# Patient Record
Sex: FEMALE | Race: WHITE | Employment: FULL TIME | URBAN - METROPOLITAN AREA
[De-identification: names, ages, dates, MRNs, and addresses within clinical notes are randomized per-mention and may not be internally consistent; named-entity substitution may affect disease eponyms.]

---

## 2017-10-19 ENCOUNTER — HOSPITAL ENCOUNTER (OUTPATIENT)
Dept: MAMMOGRAPHY | Age: 42
Discharge: HOME | End: 2017-10-19
Attending: SPECIALIST | Admitting: SPECIALIST

## 2017-10-19 DIAGNOSIS — Z12.31 VISIT FOR SCREENING MAMMOGRAM: ICD-10-CM

## 2018-10-26 ENCOUNTER — HOSPITAL ENCOUNTER (OUTPATIENT)
Dept: MAMMOGRAPHY | Age: 43
Discharge: HOME | End: 2018-10-26
Payer: COMMERCIAL

## 2018-10-26 DIAGNOSIS — Z12.31 VISIT FOR SCREENING MAMMOGRAM: ICD-10-CM

## 2018-10-26 PROCEDURE — 77067 SCR MAMMO BI INCL CAD: CPT

## 2019-02-13 ENCOUNTER — HOSPITAL ENCOUNTER (OUTPATIENT)
Dept: PHYSICAL THERAPY | Age: 44
Setting detail: THERAPIES SERIES
Discharge: HOME | End: 2019-02-13
Payer: COMMERCIAL

## 2019-02-13 PROCEDURE — 97161 PT EVAL LOW COMPLEX 20 MIN: CPT | Performed by: PHYSICAL THERAPIST

## 2019-02-13 PROCEDURE — G8979 MOBILITY GOAL STATUS: HCPCS | Performed by: PHYSICAL THERAPIST

## 2019-02-13 PROCEDURE — G8978 MOBILITY CURRENT STATUS: HCPCS | Performed by: PHYSICAL THERAPIST

## 2019-02-13 PROCEDURE — 97110 THERAPEUTIC EXERCISES: CPT | Performed by: PHYSICAL THERAPIST

## 2019-02-15 ENCOUNTER — HOSPITAL ENCOUNTER (OUTPATIENT)
Dept: PHYSICAL THERAPY | Age: 44
Setting detail: THERAPIES SERIES
Discharge: HOME | End: 2019-02-15
Payer: COMMERCIAL

## 2019-02-15 PROCEDURE — 97530 THERAPEUTIC ACTIVITIES: CPT | Performed by: PHYSICAL THERAPIST

## 2019-02-15 PROCEDURE — 97110 THERAPEUTIC EXERCISES: CPT | Performed by: PHYSICAL THERAPIST

## 2019-02-18 ENCOUNTER — HOSPITAL ENCOUNTER (OUTPATIENT)
Dept: PHYSICAL THERAPY | Age: 44
Setting detail: THERAPIES SERIES
Discharge: HOME | End: 2019-02-18
Payer: COMMERCIAL

## 2019-02-18 PROCEDURE — 97530 THERAPEUTIC ACTIVITIES: CPT | Performed by: PHYSICAL THERAPIST

## 2019-02-18 PROCEDURE — 97110 THERAPEUTIC EXERCISES: CPT | Performed by: PHYSICAL THERAPIST

## 2019-02-22 ENCOUNTER — HOSPITAL ENCOUNTER (OUTPATIENT)
Dept: PHYSICAL THERAPY | Age: 44
Setting detail: THERAPIES SERIES
Discharge: HOME | End: 2019-02-22
Payer: COMMERCIAL

## 2019-02-22 PROCEDURE — 97530 THERAPEUTIC ACTIVITIES: CPT | Performed by: PHYSICAL THERAPIST

## 2019-02-22 PROCEDURE — 97110 THERAPEUTIC EXERCISES: CPT | Performed by: PHYSICAL THERAPIST

## 2019-02-26 ENCOUNTER — APPOINTMENT (OUTPATIENT)
Dept: PHYSICAL THERAPY | Age: 44
End: 2019-02-26
Payer: COMMERCIAL

## 2019-02-27 ENCOUNTER — HOSPITAL ENCOUNTER (OUTPATIENT)
Dept: PHYSICAL THERAPY | Age: 44
Setting detail: THERAPIES SERIES
Discharge: HOME | End: 2019-02-27
Payer: COMMERCIAL

## 2019-02-27 PROCEDURE — 97110 THERAPEUTIC EXERCISES: CPT | Performed by: PHYSICAL THERAPIST

## 2019-02-27 PROCEDURE — 97530 THERAPEUTIC ACTIVITIES: CPT | Performed by: PHYSICAL THERAPIST

## 2019-03-08 ENCOUNTER — HOSPITAL ENCOUNTER (OUTPATIENT)
Dept: PHYSICAL THERAPY | Age: 44
Setting detail: THERAPIES SERIES
Discharge: HOME | End: 2019-03-08
Payer: COMMERCIAL

## 2019-03-08 PROCEDURE — 97530 THERAPEUTIC ACTIVITIES: CPT | Performed by: PHYSICAL THERAPIST

## 2019-03-08 PROCEDURE — 97112 NEUROMUSCULAR REEDUCATION: CPT | Performed by: PHYSICAL THERAPIST

## 2019-03-08 PROCEDURE — 97110 THERAPEUTIC EXERCISES: CPT | Performed by: PHYSICAL THERAPIST

## 2019-03-15 ENCOUNTER — APPOINTMENT (OUTPATIENT)
Dept: PHYSICAL THERAPY | Age: 44
End: 2019-03-15
Payer: COMMERCIAL

## 2019-04-03 ENCOUNTER — HOSPITAL ENCOUNTER (OUTPATIENT)
Dept: PHYSICAL THERAPY | Age: 44
Setting detail: THERAPIES SERIES
Discharge: HOME | End: 2019-04-03
Payer: COMMERCIAL

## 2019-04-03 PROCEDURE — 97112 NEUROMUSCULAR REEDUCATION: CPT | Performed by: PHYSICAL THERAPIST

## 2019-04-03 PROCEDURE — 97530 THERAPEUTIC ACTIVITIES: CPT | Performed by: PHYSICAL THERAPIST

## 2019-04-03 PROCEDURE — 97110 THERAPEUTIC EXERCISES: CPT | Performed by: PHYSICAL THERAPIST

## 2019-04-03 NOTE — FLOWSHEET NOTE
[facility] [address] [place] [address] [facility]    Physical Therapy Daily Treatment Note  Date:  4/3/2019    Patient Name:  Kai Mai    :  1975  MRN: [id number]  Restrictions/Precautions:    Medical/Treatment Diagnosis Information:  · Diagnosis: s/p Left knee A-scope    · DOS: 19  · Treatment Diagnosis: decreased ROM; decreased strength; increased swelling; occasional pain  Insurance/Certification information:  PT Insurance Information: [employer]  Physician Information:  Referring Practitioner: Dilcia Haines MD  Plan of care signed (Y/N):     Date of Patient follow up with Physician: 19    G-Code (if applicable):      Date G-Code Applied:  19  PT G-Codes  Functional Assessment Tool Used: LEFS  Score: 69%  Functional Limitation: Mobility: Walking and moving around  Mobility: Walking and Moving Around Current Status (): At least 20 percent but less than 40 percent impaired, limited or restricted  Mobility: Walking and Moving Around Goal Status (): 0 percent impaired, limited or restricted   Updated LEFS 4-3-19: 80%    Progress Note: [x]  Yes  []  No  Next due by: Visit #10       Latex Allergy:  [x]NO      []YES  Preferred Language for Healthcare:   [x]English       []other:    Visit # Insurance Allowable   7 25 hard max     Pain level:   3-6/10    SUBJECTIVE:  9 weeks post-op. Pt has not been to therapy for 4 weeks. She states that she is doing well overall. She c/o's some re-occurring discomfort inside the joint. Pt feels worse with lying down, prolonged standing. Better with WBing and using her leg.      OBJECTIVE:   Observation: Quad/vmo= fair/fair-  Test measurements:    Knee flexion ROM= +2 to 142 degrees    RESTRICTIONS/PRECAUTIONS: OA    Exercises/Interventions:   Exercise/Equipment Resistance/Repetitions Other comments   Stretching     Hamstring [id number]    Towel Pull     Inclined Calf [id number]    Hip Flexor stretch Leg on chair [id number]    ITB     Groin Quad                    SLR     Supine 2# 3x10 Extension lag/VCs   Abduction 2# 3x10 Extension lag/VCs   Adduction 1# 3x10 VCs   Prone     SLR+          Isometrics     Quad sets          Patellar Glides     Medial     Superior     Inferior          ROM     Sheet Pulls     Hang Weights     Passive     Active     Weight Shift     Ankle Pumps     ERMI               CKC     Calf raises     Wall sits [id number]    Step ups     1 leg stand [id number]    Squatting     CC TKE     Balance LOS    bridges 3x10         PRE     Extension  RANGE:   Flexion  RANGE:        Quantum machines     Leg press  65# 3x10 VCs to not hyperextend the knee 90-10 degrees   Leg extension     Leg curl 25# 3x10 0-90 degrees        Manual interventions                     Therapeutic Exercise and NMR EXR  [x] (33263) Provided verbal/tactile cueing for activities related to strengthening, flexibility, endurance, ROM for improvements in LE, proximal hip, and core control with self care, mobility, lifting, ambulation.  [] (98571) Provided verbal/tactile cueing for activities related to improving balance, coordination, kinesthetic sense, posture, motor skill, proprioception  to assist with LE, proximal hip, and core control in self care, mobility, lifting, ambulation and eccentric single leg control.      NMR and Therapeutic Activities:    [x] (42779 or 20025) Provided verbal/tactile cueing for activities related to improving balance, coordination, kinesthetic sense, posture, motor skill, proprioception and motor activation to allow for proper function of core, proximal hip and LE with self care and ADLs  [] (38043) Gait Re-education- Provided training and instruction to the patient for proper LE, core and proximal hip recruitment and positioning and eccentric body weight control with ambulation re-education including up and down stairs     Home Exercise Program:    [x] (89413) Reviewed/Progressed HEP activities related to strengthening, flexibility, endurance, ROM of core, proximal hip and LE for functional self-care, mobility, lifting and ambulation/stair navigation   [x] (41882)Reviewed/Progressed HEP activities related to improving balance, coordination, kinesthetic sense, posture, motor skill, proprioception of core, proximal hip and LE for self care, mobility, lifting, and ambulation/stair navigation      Manual Treatments:  PROM / STM / Oscillations-Mobs:  G-I, II, III, IV (PA's, Inf., Post.)  [] (06367) Provided manual therapy to mobilize LE, proximal hip and/or LS spine soft tissue/joints for the purpose of modulating pain, promoting relaxation,  increasing ROM, reducing/eliminating soft tissue swelling/inflammation/restriction, improving soft tissue extensibility and allowing for proper ROM for normal function with self care, mobility, lifting and ambulation. Modalities:  Ice x 15 min    Charges:  Timed Code Treatment Minutes: 60   Total Treatment Minutes: 75       [] EVAL (LOW) 27665 (typically 20 minutes face-to-face)  [] EVAL (MOD) 41081 (typically 30 minutes face-to-face)  [] EVAL (HIGH) 21020 (typically 45 minutes face-to-face)  [] RE-EVAL   [x] YM(49780) x  2   [] IONTO  [x] NMR (36805) x  1   [] VASO  [] Manual (29994) x       [] Other:  [x] TA x  1    [] Mech Traction (61497)  [] ES(attended) (52752)      [] ES (un) (75932):      GOALS:  Patient stated goal: Flexibility. Less discomfort.      Therapist goals for Patient:   Short Term Goals: To be achieved in: 2 weeks  1. Independent in HEP and progression per patient tolerance, in order to prevent re-injury. 2. Patient will have a decrease in pain to facilitate improvement in movement, function, and ADLs as indicated by Functional Deficits.     Long Term Goals: To be achieved in: 8 weeks  1. Disability index score of 10% or less for the LEFS to assist with reaching prior level of function. Progressing well  2.  Patient will demonstrate increased AROM to WNL to allow for proper joint functioning as indicated by patients Functional Deficits. met  3. Patient will demonstrate an increase in Strength to good proximal hip strength and control in LE to allow for proper functional mobility as indicated by patients Functional Deficits. progressing well  4. Patient will return to ADL and household functional activities without increased symptoms or restriction. Progressing well  5. Pt will return to recreational activities including kneeling and squatting movements. not met    Progression Towards Functional goals:  [x] Patient is progressing as expected towards functional goals listed. [] Progression is slowed due to complexities listed. [] Progression has been slowed due to co-morbidities. [] Plan just implemented, too soon to assess goals progression  [] Other:     ASSESSMENT:  Pt is progressing steadily with strength and endurance 9 weeks post-surgery. She has occasional knee pain and she must be aware of patella protection during activities. The pt does not have weights at home or access to the gym. Extensive pt education on weights and resistance to increase strength and the importance of this for managing arthritis and improving her function. She is making progress overall towards the goals. Treatment/Activity Tolerance:  [x] Patient tolerated treatment well [] Patient limited by fatique  [] Patient limited by pain  [] Patient limited by other medical complications  [] Other:     Patient education: HEP/ progression at home and at the gym/pathology    Prognosis: [x] Good [] Fair  [] Poor    Patient Requires Follow-up: [x] Yes  [] No    PLAN: Strengthening, stretching, balance exercises.    [x] Continue per plan of care [] Alter current plan (see comments)  [] Plan of care initiated [] Hold pending MD visit [] Discharge    Electronically signed by: Ashlie Cali PT

## 2019-04-12 ENCOUNTER — HOSPITAL ENCOUNTER (OUTPATIENT)
Dept: PHYSICAL THERAPY | Age: 44
Setting detail: THERAPIES SERIES
Discharge: HOME | End: 2019-04-12
Payer: COMMERCIAL

## 2019-04-12 PROCEDURE — 97112 NEUROMUSCULAR REEDUCATION: CPT | Performed by: PHYSICAL THERAPIST

## 2019-04-12 PROCEDURE — 97530 THERAPEUTIC ACTIVITIES: CPT | Performed by: PHYSICAL THERAPIST

## 2019-04-12 PROCEDURE — 97110 THERAPEUTIC EXERCISES: CPT | Performed by: PHYSICAL THERAPIST

## 2019-04-12 NOTE — FLOWSHEET NOTE
[address] and [facility], [facility]    Physical Therapy Daily Treatment Note  Date:  2019    Patient Name:  Juan Lyon    :  1975  MRN: [id number]  Restrictions/Precautions:    Medical/Treatment Diagnosis Information:  · Diagnosis: s/p Left knee A-scope    · DOS: 19  · Treatment Diagnosis: decreased ROM; decreased strength; increased swelling; occasional pain  Insurance/Certification information:  PT Insurance Information: [employer]  Physician Information:  Referring Practitioner: Ninfa Fontanez MD  Plan of care signed (Y/N):     Date of Patient follow up with Physician:     G-Code (if applicable):      Date G-Code Applied:  19  PT G-Codes  Functional Assessment Tool Used: LEFS  Score: 69%  Functional Limitation: Mobility: Walking and moving around  Mobility: Walking and Moving Around Current Status (): At least 20 percent but less than 40 percent impaired, limited or restricted  Mobility: Walking and Moving Around Goal Status (): 0 percent impaired, limited or restricted   Updated LEFS 4-3-19: 80%    Progress Note: [x]  Yes  []  No  Next due by: Visit #10       Latex Allergy:  [x]NO      []YES  Preferred Language for Healthcare:   [x]English       []other:    Visit # Insurance Allowable   8 25 hard max     Pain level:       SUBJECTIVE:  Pt is doing well today.     OBJECTIVE:   Observation: Quad/vmo= fair/fair-  Test measurements:    Knee flexion ROM= +2 to 142 degrees    RESTRICTIONS/PRECAUTIONS: OA    Exercises/Interventions:   Exercise/Equipment Resistance/Repetitions Other comments   Stretching     Hamstring [id number]    Towel Pull     Inclined Calf 2e23erv    Hip Flexor stretch Leg on chair [id number]    ITB     Groin     Quad                    SLR     Supine 2# 3x10 Extension lag/VCs   Abduction 2# 3x10 Extension lag/VCs   Adduction 2# 3x10 VCs   Prone 0# 3x10    SLR+          Isometrics     Quad sets          Patellar Glides     Medial kinesthetic sense, posture, motor skill, proprioception of core, proximal hip and LE for self care, mobility, lifting, and ambulation/stair navigation      Manual Treatments:  PROM / STM / Oscillations-Mobs:  G-I, II, III, IV (PA's, Inf., Post.)  [] (66838) Provided manual therapy to mobilize LE, proximal hip and/or LS spine soft tissue/joints for the purpose of modulating pain, promoting relaxation,  increasing ROM, reducing/eliminating soft tissue swelling/inflammation/restriction, improving soft tissue extensibility and allowing for proper ROM for normal function with self care, mobility, lifting and ambulation. Modalities:  Ice x 15 min    Charges:  Timed Code Treatment Minutes: 52   Total Treatment Minutes: 70       [] EVAL (LOW) 34049 (typically 20 minutes face-to-face)  [] EVAL (MOD) 25837 (typically 30 minutes face-to-face)  [] EVAL (HIGH) 25622 (typically 45 minutes face-to-face)  [] RE-EVAL   [x] ZI(37976) x  1   [] IONTO  [x] NMR (97319) x  1   [] VASO  [] Manual (25605) x       [] Other:  [x] TA x  1    [] Mech Traction (02020)  [] ES(attended) (16857)      [] ES (un) (36141):      GOALS:  Patient stated goal: Flexibility. Less discomfort.      Therapist goals for Patient:   Short Term Goals: To be achieved in: 2 weeks  1. Independent in HEP and progression per patient tolerance, in order to prevent re-injury. 2. Patient will have a decrease in pain to facilitate improvement in movement, function, and ADLs as indicated by Functional Deficits.     Long Term Goals: To be achieved in: 8 weeks  1. Disability index score of 10% or less for the LEFS to assist with reaching prior level of function. Progressing well  2. Patient will demonstrate increased AROM to WNL to allow for proper joint functioning as indicated by patients Functional Deficits. met  3.  Patient will demonstrate an increase in Strength to good proximal hip strength and control in LE to allow for proper functional mobility as indicated by patients Functional Deficits. progressing well  4. Patient will return to ADL and household functional activities without increased symptoms or restriction. Progressing well  5. Pt will return to recreational activities including kneeling and squatting movements. not met    Progression Towards Functional goals:  [x] Patient is progressing as expected towards functional goals listed. [] Progression is slowed due to complexities listed. [] Progression has been slowed due to co-morbidities. [] Plan just implemented, too soon to assess goals progression  [] Other:     ASSESSMENT:  Pt has some hip flexor tendinitis. No change in the knee today. Continue to focus on strengthening and stretching for the LE. Increase glute activation to address the hip flexor irritation. Quad strengthening with PF protection. Treatment/Activity Tolerance:  [x] Patient tolerated treatment well [] Patient limited by fatique  [] Patient limited by pain  [] Patient limited by other medical complications  [] Other:     Patient education: HEP    Prognosis: [x] Good [] Fair  [] Poor    Patient Requires Follow-up: [x] Yes  [] No    PLAN: Strengthening, stretching, balance exercises.    [x] Continue per plan of care [] Alter current plan (see comments)  [] Plan of care initiated [] Hold pending MD visit [] Discharge    Electronically signed by: Elva Edmond PT

## 2019-04-18 ENCOUNTER — APPOINTMENT (OUTPATIENT)
Dept: PHYSICAL THERAPY | Age: 44
End: 2019-04-18
Payer: COMMERCIAL

## 2019-04-25 ENCOUNTER — HOSPITAL ENCOUNTER (OUTPATIENT)
Dept: PHYSICAL THERAPY | Age: 44
Setting detail: THERAPIES SERIES
Discharge: HOME | End: 2019-04-25
Payer: COMMERCIAL

## 2019-04-25 PROCEDURE — 97110 THERAPEUTIC EXERCISES: CPT | Performed by: PHYSICAL THERAPIST

## 2019-04-25 PROCEDURE — 97112 NEUROMUSCULAR REEDUCATION: CPT | Performed by: PHYSICAL THERAPIST

## 2019-04-25 NOTE — FLOWSHEET NOTE
[address] [facility]    Physical Therapy Daily Treatment Note  Date:  2019    Patient Name:  Celsa Burroughs    :  1975  MRN: [id number]  Restrictions/Precautions:    Medical/Treatment Diagnosis Information:  · Diagnosis: s/p Left knee A-scope    · DOS: 19  · Treatment Diagnosis: decreased ROM; decreased strength; increased swelling; occasional pain  Insurance/Certification information:  PT Insurance Information: [employer]  Physician Information:  Referring Practitioner: Vickie Escalante MD  Plan of care signed (Y/N):     Date of Patient follow up with Physician:     G-Code (if applicable):      Date G-Code Applied:  19  PT G-Codes  Functional Assessment Tool Used: LEFS  Score: 69%  Functional Limitation: Mobility: Walking and moving around  Mobility: Walking and Moving Around Current Status (): At least 20 percent but less than 40 percent impaired, limited or restricted  Mobility: Walking and Moving Around Goal Status (): 0 percent impaired, limited or restricted   Updated LEFS 4-3-19: 80%    Progress Note: [x]  Yes  []  No  Next due by: Visit #10       Latex Allergy:  [x]NO      []YES  Preferred Language for Healthcare:   [x]English       []other:    Visit # Insurance Allowable   8 25 hard max     Pain level:       SUBJECTIVE:  Pt states that she is feeling pretty good, \"it waxes and wans, I have good days and bad days. \" Pt feels that most of her discomfort is coming from tightness in her quad and hip and that if she could get that better it would improve her symptoms.     OBJECTIVE:   Observation: Quad/vmo= fair/fair-  Test measurements:    Knee flexion ROM= +2 to 142 degrees    RESTRICTIONS/PRECAUTIONS: OA    Exercises/Interventions:   Exercise/Equipment Resistance/Repetitions Other comments   Stretching     Hamstring [id number]    Towel Pull     Inclined Calf 6p10tss    Hip Flexor stretch     ITB     Groin     Quad Prone with strap [id number] Tiger tail 2' quad              SLR     Supine 2# 3x10 Extension lag/VCs   Abduction 2# 3x10 Extension lag/VCs   Adduction 2# 3x10 VCs   Prone 0# 3x10    SLR+          Isometrics     Quad sets          Patellar Glides     Medial     Superior     Inferior          ROM     Sheet Pulls     Hang Weights     Passive     Active     Weight Shift     Ankle Pumps     ERMI               CKC     Calf raises     Wall sits [id number]    Step ups     1 leg stand [id number]    Squatting     CC TKE     Balance LOS    bridges 3x10 blue band    Clamshells 3x10 blue band, B         PRE     Extension  RANGE:   Flexion  RANGE:        Quantum machines     Leg press  65# 3x10 VCs to not hyperextend the knee 90-10 degrees   Leg extension     Leg curl 25# 3x10 0-90 degrees        Manual interventions                     Therapeutic Exercise and NMR EXR  [x] (19218) Provided verbal/tactile cueing for activities related to strengthening, flexibility, endurance, ROM for improvements in LE, proximal hip, and core control with self care, mobility, lifting, ambulation. [x] (09027) Provided verbal/tactile cueing for activities related to improving balance, coordination, kinesthetic sense, posture, motor skill, proprioception  to assist with LE, proximal hip, and core control in self care, mobility, lifting, ambulation and eccentric single leg control.      NMR and Therapeutic Activities:    [x] (70319 or 78477) Provided verbal/tactile cueing for activities related to improving balance, coordination, kinesthetic sense, posture, motor skill, proprioception and motor activation to allow for proper function of core, proximal hip and LE with self care and ADLs  [] (73827) Gait Re-education- Provided training and instruction to the patient for proper LE, core and proximal hip recruitment and positioning and eccentric body weight control with ambulation re-education including up and down stairs     Home Exercise Program:   4/25/19 added bridges, alvin, prone quad stretch, STM with rolling pin   [x] (19821) Reviewed/Progressed HEP activities related to strengthening, flexibility, endurance, ROM of core, proximal hip and LE for functional self-care, mobility, lifting and ambulation/stair navigation   [x] (02082)Reviewed/Progressed HEP activities related to improving balance, coordination, kinesthetic sense, posture, motor skill, proprioception of core, proximal hip and LE for self care, mobility, lifting, and ambulation/stair navigation      Manual Treatments:  PROM / STM / Oscillations-Mobs:  G-I, II, III, IV (PA's, Inf., Post.)  [] (65577) Provided manual therapy to mobilize LE, proximal hip and/or LS spine soft tissue/joints for the purpose of modulating pain, promoting relaxation,  increasing ROM, reducing/eliminating soft tissue swelling/inflammation/restriction, improving soft tissue extensibility and allowing for proper ROM for normal function with self care, mobility, lifting and ambulation. Modalities:  Ice x 15 min    Charges:  Timed Code Treatment Minutes: 23   Total Treatment Minutes: 81       [] EVAL (LOW) 21761 (typically 20 minutes face-to-face)  [] EVAL (MOD) 97393 (typically 30 minutes face-to-face)  [] EVAL (HIGH) 48941 (typically 45 minutes face-to-face)  [] RE-EVAL   [x] YQ(34425) x  1   [] IONTO  [x] NMR (56993) x  1   [] VASO  [] Manual (37279) x       [] Other:  [] TA x       [] Mech Traction (57235)  [] ES(attended) (56936)      [] ES (un) (51878):      GOALS:  Patient stated goal: Flexibility. Less discomfort.      Therapist goals for Patient:   Short Term Goals: To be achieved in: 2 weeks  1. Independent in HEP and progression per patient tolerance, in order to prevent re-injury. 2. Patient will have a decrease in pain to facilitate improvement in movement, function, and ADLs as indicated by Functional Deficits.     Long Term Goals: To be achieved in: 8 weeks  1.  Disability index score of 10% or less for the LEFS to assist with reaching prior level of function. Progressing well  2. Patient will demonstrate increased AROM to WNL to allow for proper joint functioning as indicated by patients Functional Deficits. met  3. Patient will demonstrate an increase in Strength to good proximal hip strength and control in LE to allow for proper functional mobility as indicated by patients Functional Deficits. progressing well  4. Patient will return to ADL and household functional activities without increased symptoms or restriction. Progressing well  5. Pt will return to recreational activities including kneeling and squatting movements. not met    Progression Towards Functional goals:  [x] Patient is progressing as expected towards functional goals listed. [] Progression is slowed due to complexities listed. [] Progression has been slowed due to co-morbidities. [] Plan just implemented, too soon to assess goals progression  [] Other:     ASSESSMENT:  Good tolerance to tiger tail to quad and prone quad stretch, stated tiger tail felt good and she felt a good stretch in her thigh in prone position. Pt reported that prone quad stretch felt better then stretch on chair as she was able to control the movement and better and felt she could go further into the motion, therefore standing stretch not performed this date. Pt to add STM with rolling pin and prone quad stretch to HEP. Good tolerance to clamshell, states she could feel her glutes working. Tolerated all other exercises well. Pt requires PT follow up to address flexibility, strength and functional mobility deficits. Treatment/Activity Tolerance:  [x] Patient tolerated treatment well [] Patient limited by fatique  [] Patient limited by pain  [] Patient limited by other medical complications  [] Other:     Patient education: HEP    Prognosis: [x] Good [] Fair  [] Poor    Patient Requires Follow-up: [x] Yes  [] No    PLAN: Strengthening, stretching, balance exercises.    [x] Continue per plan of care [] Alter current plan (see comments)  [] Plan of care initiated [] Hold pending MD visit [] Discharge    Electronically signed by: Gloria Carlson PT, DPT  Physical Therapist  [email or web address]  [email or web address]

## 2019-05-03 ENCOUNTER — HOSPITAL ENCOUNTER (OUTPATIENT)
Dept: PHYSICAL THERAPY | Age: 44
Setting detail: THERAPIES SERIES
Discharge: HOME | End: 2019-05-03
Payer: COMMERCIAL

## 2019-05-03 PROCEDURE — 97530 THERAPEUTIC ACTIVITIES: CPT | Performed by: PHYSICAL THERAPIST

## 2019-05-03 PROCEDURE — 97110 THERAPEUTIC EXERCISES: CPT | Performed by: PHYSICAL THERAPIST

## 2019-05-03 PROCEDURE — 97112 NEUROMUSCULAR REEDUCATION: CPT | Performed by: PHYSICAL THERAPIST

## 2019-05-03 NOTE — FLOWSHEET NOTE
[facility]    Physical Therapy Daily Treatment Note  Date:  5/3/2019    Patient Name:  Rocio Mitchell    :  1975  MRN: [id number]  Restrictions/Precautions:    Medical/Treatment Diagnosis Information:  · Diagnosis: s/p Left knee A-scope    · DOS: 19  · Treatment Diagnosis: decreased ROM; decreased strength; increased swelling; occasional pain  Insurance/Certification information:  PT Insurance Information: [employer]  Physician Information:  Referring Practitioner: Jaime Constantino MD  Plan of care signed (Y/N):     Date of Patient follow up with Physician:     G-Code (if applicable):      Date G-Code Applied:  19  PT G-Codes  Functional Assessment Tool Used: LEFS  Score: 69%  Functional Limitation: Mobility: Walking and moving around  Mobility: Walking and Moving Around Current Status (): At least 20 percent but less than 40 percent impaired, limited or restricted  Mobility: Walking and Moving Around Goal Status (): 0 percent impaired, limited or restricted   Updated LEFS 4-3-19: 80%    Progress Note: [x]  Yes  []  No  Next due by: Visit #10       Latex Allergy:  [x]NO      []YES  Preferred Language for Healthcare:   [x]English       []other:    Visit # Insurance Allowable   9 25 hard max     Pain level:       SUBJECTIVE:  Pt states she continues to have hip discomfort. It moves locations. Usually she feels it in the groin, but today it is more posterior. No change in the knee.      OBJECTIVE:   Observation: Quad/vmo= fair/fair-  Test measurements:    Knee flexion ROM= +2 to 142 degrees    RESTRICTIONS/PRECAUTIONS: OA    Exercises/Interventions:   Exercise/Equipment Resistance/Repetitions Other comments   Stretching     Hamstring [id number]    Towel Pull     Inclined Calf [id number]    Hip Flexor stretch     ITB     Groin     Quad Prone with strap [id number]    Tiger tail               SLR     Supine Abduction Adduction Prone SLR+ Isometrics     Quad sets          Patellar Glides     Medial     Superior     Inferior          ROM     Sheet Pulls     Hang Weights     Passive     Active     Weight Shift     Ankle Pumps     ERMI               CKC     Calf raises     Wall sits [id number]    Step ups [employer] next visit    1 leg stand [id number] on mat    Squatting     CC TKE     Balance LOS L10 x 3 rounds Medium level   bridges 3x10 blue band    Clamshells 3x10 blue band, B    SL with opposite leg movement Next visit    PRE     Extension  RANGE:   Flexion  RANGE:        Quantum machines     Leg press  80# 3x10 VCs to not hyperextend the knee 90-10 degrees   Leg extension 20# ecc 3x10 90-30 degrees   Leg curl 30# 3x10 0-90 degrees        Manual interventions                     Therapeutic Exercise and NMR EXR  [x] (86671) Provided verbal/tactile cueing for activities related to strengthening, flexibility, endurance, ROM for improvements in LE, proximal hip, and core control with self care, mobility, lifting, ambulation. [x] (44710) Provided verbal/tactile cueing for activities related to improving balance, coordination, kinesthetic sense, posture, motor skill, proprioception  to assist with LE, proximal hip, and core control in self care, mobility, lifting, ambulation and eccentric single leg control.      NMR and Therapeutic Activities:    [x] (28895 or 42340) Provided verbal/tactile cueing for activities related to improving balance, coordination, kinesthetic sense, posture, motor skill, proprioception and motor activation to allow for proper function of core, proximal hip and LE with self care and ADLs  [] (02085) Gait Re-education- Provided training and instruction to the patient for proper LE, core and proximal hip recruitment and positioning and eccentric body weight control with ambulation re-education including up and down stairs     Home Exercise Program:   4/25/19 added bridges, clamshells, prone quad stretch, STM with rolling pin   [x] (83942) Reviewed/Progressed HEP activities related to strengthening, flexibility, endurance, ROM of core, proximal hip and LE for functional self-care, mobility, lifting and ambulation/stair navigation   [x] (75781)Reviewed/Progressed HEP activities related to improving balance, coordination, kinesthetic sense, posture, motor skill, proprioception of core, proximal hip and LE for self care, mobility, lifting, and ambulation/stair navigation      Manual Treatments:  PROM / STM / Oscillations-Mobs:  G-I, II, III, IV (PA's, Inf., Post.)  [] (80714) Provided manual therapy to mobilize LE, proximal hip and/or LS spine soft tissue/joints for the purpose of modulating pain, promoting relaxation,  increasing ROM, reducing/eliminating soft tissue swelling/inflammation/restriction, improving soft tissue extensibility and allowing for proper ROM for normal function with self care, mobility, lifting and ambulation. Modalities:  Ice x 15 min    Charges:  Timed Code Treatment Minutes: 45   Total Treatment Minutes: 60       [] EVAL (LOW) 31790 (typically 20 minutes face-to-face)  [] EVAL (MOD) 32027 (typically 30 minutes face-to-face)  [] EVAL (HIGH) 90635 (typically 45 minutes face-to-face)  [] RE-EVAL   [x] BN(16049) x  1   [] IONTO  [x] NMR (21735) x  1   [] VASO  [] Manual (84067) x       [] Other:  [x] TA x  1    [] Mech Traction (90134)  [] ES(attended) (47760)      [] ES (un) (79535):      GOALS:  Patient stated goal: Flexibility. Less discomfort.      Therapist goals for Patient:   Short Term Goals: To be achieved in: 2 weeks  1. Independent in HEP and progression per patient tolerance, in order to prevent re-injury. 2. Patient will have a decrease in pain to facilitate improvement in movement, function, and ADLs as indicated by Functional Deficits.     Long Term Goals: To be achieved in: 8 weeks  1. Disability index score of 10% or less for the LEFS to assist with reaching prior level of function. Progressing well  2. Patient will demonstrate increased AROM to WNL to allow for proper joint functioning as indicated by patients Functional Deficits. met  3. Patient will demonstrate an increase in Strength to good proximal hip strength and control in LE to allow for proper functional mobility as indicated by patients Functional Deficits. progressing well  4. Patient will return to ADL and household functional activities without increased symptoms or restriction. Progressing well  5. Pt will return to recreational activities including kneeling and squatting movements. not met    Progression Towards Functional goals:  [x] Patient is progressing as expected towards functional goals listed. [] Progression is slowed due to complexities listed. [] Progression has been slowed due to co-morbidities. [] Plan just implemented, too soon to assess goals progression  [] Other:     ASSESSMENT:  The patient continues to have hip discomfort. We focused today's program on strengthening and stretching exercises for the hip and the knee. Pt requires glute, hip and quad strengthening to stabilize the knee and the hip. She has tightness in the hip flexor, quads and HS. Pt was able to tolerate leg extension at 90-30. She is challenged by SLS balance activities. Pt does not have access to the gym for machine work. Treatment/Activity Tolerance:  [x] Patient tolerated treatment well [] Patient limited by fatique  [] Patient limited by pain  [] Patient limited by other medical complications  [] Other:     Patient education: HEP updated 5-3-19    Prognosis: [x] Good [] Fair  [] Poor    Patient Requires Follow-up: [x] Yes  [] No    PLAN: Strengthening, stretching, balance exercises.    [x] Continue per plan of care [] Alter current plan (see comments)  [] Plan of care initiated [] Hold pending MD visit [] Discharge    Electronically signed by: Ramandeep Bravo PT

## 2019-05-10 ENCOUNTER — APPOINTMENT (OUTPATIENT)
Dept: PHYSICAL THERAPY | Age: 44
End: 2019-05-10
Payer: COMMERCIAL

## 2019-06-06 ENCOUNTER — HOSPITAL ENCOUNTER (OUTPATIENT)
Dept: PHYSICAL THERAPY | Age: 44
Setting detail: THERAPIES SERIES
Discharge: HOME | End: 2019-06-06
Payer: COMMERCIAL

## 2019-06-06 PROCEDURE — 97110 THERAPEUTIC EXERCISES: CPT | Performed by: PHYSICAL THERAPIST

## 2019-06-06 PROCEDURE — 97530 THERAPEUTIC ACTIVITIES: CPT | Performed by: PHYSICAL THERAPIST

## 2019-06-06 PROCEDURE — 97112 NEUROMUSCULAR REEDUCATION: CPT | Performed by: PHYSICAL THERAPIST

## 2019-06-06 NOTE — FLOWSHEET NOTE
Around Current Status (): At least 20 percent but less than 40 percent impaired, limited or restricted  Mobility: Walking and Moving Around Goal Status (): 0 percent impaired, limited or restricted   Updated LEFS 4-3-19: 80%    Progress Note: [x]  Yes  []  No  Next due by: Visit #10       Latex Allergy:  [x]NO      []YES  Preferred Language for Healthcare:   [x]English       []other:    Visit # Insurance Allowable   10 25 hard max     Pain level:       SUBJECTIVE:  Pt has discomfort with knee flexion positions. She has not been working out regularly due to work and caring for her mother who had a TKR 4 weeks ago.      OBJECTIVE:   Observation: Quad/vmo= fair/fair-  Test measurements:    Knee flexion ROM= +2 to 142 degrees    RESTRICTIONS/PRECAUTIONS: OA    Exercises/Interventions:   Exercise/Equipment Resistance/Repetitions Other comments   Stretching     Hamstring [id number]    Towel Pull     Inclined Calf [id number]    Hip Flexor stretch     ITB     Groin     Quad Prone with strap [id number]    Tiger tail               SLR     Supine 3/2/2# 3x10 Abduction 2# 3x10 Adduction 2# 3x10 Prone 0# 3x10 SLR+          Isometrics     Quad sets          Patellar Glides     Medial     Superior     Inferior          ROM     Sheet Pulls     Hang Weights     Passive     Active     Weight Shift     Ankle Pumps     ERMI               CKC     Calf raises     Wall sits 3 to fatigue Push through heels   Step ups [employer]  L1 3x10    1 leg stand 1x54lzw on mat    Squatting     CC TKE     Balance Maze L9 x 3 rounds; LOS L9 x 2 min    bridges 3x10 blue band    Clamshells 3x10 blue band, B    SL with opposite leg movement 3x 5 reps 3 position slide   PRE     Extension  RANGE:   Flexion  RANGE:        Quantum machines     Leg press  85# 3x10 VCs to not hyperextend the knee 90-10 degrees   Leg extension 20# ecc 3x10 90-30 degrees   Leg curl 25# 3x10 0-90 degrees        Manual interventions                     Therapeutic Exercise and NMR EXR  [x] (68276) Provided verbal/tactile cueing for activities related to strengthening, flexibility, endurance, ROM for improvements in LE, proximal hip, and core control with self care, mobility, lifting, ambulation. [x] (30165) Provided verbal/tactile cueing for activities related to improving balance, coordination, kinesthetic sense, posture, motor skill, proprioception  to assist with LE, proximal hip, and core control in self care, mobility, lifting, ambulation and eccentric single leg control.      NMR and Therapeutic Activities:    [x] (25643 or 59669) Provided verbal/tactile cueing for activities related to improving balance, coordination, kinesthetic sense, posture, motor skill, proprioception and motor activation to allow for proper function of core, proximal hip and LE with self care and ADLs  [] (41347) Gait Re-education- Provided training and instruction to the patient for proper LE, core and proximal hip recruitment and positioning and eccentric body weight control with ambulation re-education including up and down stairs     Home Exercise Program:   [x] (87258) Reviewed/Progressed HEP activities related to strengthening, flexibility, endurance, ROM of core, proximal hip and LE for functional self-care, mobility, lifting and ambulation/stair navigation   [x] (23442)Reviewed/Progressed HEP activities related to improving balance, coordination, kinesthetic sense, posture, motor skill, proprioception of core, proximal hip and LE for self care, mobility, lifting, and ambulation/stair navigation      Manual Treatments:  PROM / STM / Oscillations-Mobs:  G-I, II, III, IV (PA's, Inf., Post.)  [] (53856) Provided manual therapy to mobilize LE, proximal hip and/or LS spine soft tissue/joints for the purpose of modulating pain, promoting relaxation,  increasing ROM, reducing/eliminating soft tissue swelling/inflammation/restriction, improving soft tissue extensibility and allowing for proper ROM for normal Other: ASSESSMENT:  Pt has discomfort with knee flexion positions secondary to PFA. She has resumed the strengthening program today without difficulty only fatigue. Pt will benefit from a strengthening and endurance program as well as NM training for balance. Treatment/Activity Tolerance:  [x] Patient tolerated treatment well [] Patient limited by fatique  [] Patient limited by pain  [] Patient limited by other medical complications  [] Other:     Patient education: HEP for stretching, strengthening, endurance, balance. Prognosis: [x] Good [] Fair  [] Poor    Patient Requires Follow-up: [x] Yes  [] No    PLAN: Strengthening, stretching, balance exercises. Cont 1x/week for 4 weeks.    [x] Continue per plan of care [] Alter current plan (see comments)  [] Plan of care initiated [] Hold pending MD visit [] Discharge    Electronically signed by: Orly Borrego PT

## 2019-06-13 ENCOUNTER — HOSPITAL ENCOUNTER (OUTPATIENT)
Dept: PHYSICAL THERAPY | Age: 44
Setting detail: THERAPIES SERIES
Discharge: HOME | End: 2019-06-13
Payer: COMMERCIAL

## 2019-06-13 PROCEDURE — 97530 THERAPEUTIC ACTIVITIES: CPT | Performed by: PHYSICAL THERAPIST

## 2019-06-13 PROCEDURE — 97110 THERAPEUTIC EXERCISES: CPT | Performed by: PHYSICAL THERAPIST

## 2019-06-13 PROCEDURE — 97112 NEUROMUSCULAR REEDUCATION: CPT | Performed by: PHYSICAL THERAPIST

## 2019-06-13 NOTE — FLOWSHEET NOTE
[address] [facility]    Physical Therapy Daily Treatment Note  Date:  2019    Patient Name:  Selvin Londono    :  1975  MRN: [id number]  Restrictions/Precautions:    Medical/Treatment Diagnosis Information:  · Diagnosis: s/p Left knee A-scope    · DOS: 19  · Treatment Diagnosis: decreased ROM; decreased strength; increased swelling; occasional pain  Insurance/Certification information:  PT Insurance Information: [employer]  Physician Information:  Referring Practitioner: Cris Leung MD  Plan of care signed (Y/N):     Date of Patient follow up with Physician:     G-Code (if applicable):      Date G-Code Applied:  19  PT G-Codes  Functional Assessment Tool Used: LEFS  Score: 69%  Functional Limitation: Mobility: Walking and moving around  Mobility: Walking and Moving Around Current Status (): At least 20 percent but less than 40 percent impaired, limited or restricted  Mobility: Walking and Moving Around Goal Status (): 0 percent impaired, limited or restricted   Updated LEFS 4-3-19: 80%    Progress Note: [x]  Yes  []  No  Next due by: Visit #20       Latex Allergy:  [x]NO      []YES  Preferred Language for Healthcare:   [x]English       []other:    Visit # Insurance Allowable   11 25 hard max     Pain level:       SUBJECTIVE:  Pt has pain below the patella. She notices tightness when she does a full squat. She has occasional flair-ups. Cross-legged positions increase pain. \"Full and uncomfortable\".      OBJECTIVE:   Observation: Quad/vmo= fair/fair-  Test measurements:    Knee flexion ROM= +2 to 142 degrees    RESTRICTIONS/PRECAUTIONS: OA    Exercises/Interventions:   Exercise/Equipment Resistance/Repetitions Other comments   Stretching     Hamstring [id number]    Towel Pull     Inclined Calf [id number]    Hip Flexor stretch     ITB     Groin     Quad Prone with strap [id number] Or standing   Tiger tail               SLR     Supine 2# 3x10 Abduction 2# 3x10 Adduction 2# 3x10 Prone 0# 3x10 SLR+          Isometrics     Quad sets          Patellar Glides     Medial     Superior     Inferior          ROM     Sheet Pulls     Hang Weights     Passive     Active     Weight Shift     Ankle Pumps     ERMI               CKC     Calf raises     Wall sits 3 to fatigue with bolster Push through heels   Step ups [employer]  L1 3x10    1 leg stand [id number] on mat 3 times eyes closed   Squatting     CC TKE     Balance LOS L9 x 4 rounds    bridges 3x10 black band    Clamshells 3x10 black band, B    SL with opposite leg movement 3x 5 reps 3 position slide   PRE     Extension  RANGE:   Flexion  RANGE:        Quantum machines     Leg press  90# 3x10 VCs to not hyperextend the knee 90-10 degrees   Leg extension 20# ecc 3x10 90-30 degrees   Leg curl 25# 3x10 0-90 degrees        Manual interventions                     Therapeutic Exercise and NMR EXR  [x] (17037) Provided verbal/tactile cueing for activities related to strengthening, flexibility, endurance, ROM for improvements in LE, proximal hip, and core control with self care, mobility, lifting, ambulation. [x] (51001) Provided verbal/tactile cueing for activities related to improving balance, coordination, kinesthetic sense, posture, motor skill, proprioception  to assist with LE, proximal hip, and core control in self care, mobility, lifting, ambulation and eccentric single leg control.      NMR and Therapeutic Activities:    [x] (45797 or 75602) Provided verbal/tactile cueing for activities related to improving balance, coordination, kinesthetic sense, posture, motor skill, proprioception and motor activation to allow for proper function of core, proximal hip and LE with self care and ADLs  [] (21295) Gait Re-education- Provided training and instruction to the patient for proper LE, core and proximal hip recruitment and positioning and eccentric body weight control with ambulation re-education including up and down stairs Home Exercise Program:   [x] (16289) Reviewed/Progressed HEP activities related to strengthening, flexibility, endurance, ROM of core, proximal hip and LE for functional self-care, mobility, lifting and ambulation/stair navigation   [x] (17886)Reviewed/Progressed HEP activities related to improving balance, coordination, kinesthetic sense, posture, motor skill, proprioception of core, proximal hip and LE for self care, mobility, lifting, and ambulation/stair navigation      Manual Treatments:  PROM / STM / Oscillations-Mobs:  G-I, II, III, IV (PA's, Inf., Post.)  [] (07777) Provided manual therapy to mobilize LE, proximal hip and/or LS spine soft tissue/joints for the purpose of modulating pain, promoting relaxation,  increasing ROM, reducing/eliminating soft tissue swelling/inflammation/restriction, improving soft tissue extensibility and allowing for proper ROM for normal function with self care, mobility, lifting and ambulation. Modalities:  Ice x 15 min    Charges:  Timed Code Treatment Minutes: 65   Total Treatment Minutes: 80       [] EVAL (LOW) 95304 (typically 20 minutes face-to-face)  [] EVAL (MOD) 91375 (typically 30 minutes face-to-face)  [] EVAL (HIGH) 77724 (typically 45 minutes face-to-face)  [] RE-EVAL   [x] IW(92715) x  2   [] IONTO  [x] NMR (61633) x  1   [] VASO  [] Manual (91335) x       [] Other:  [x] TA x  1    [] Mech Traction (15815)  [] ES(attended) (54575)      [] ES (un) (00281):      GOALS:  Patient stated goal: Flexibility. Less discomfort.      Therapist goals for Patient:   Short Term Goals: To be achieved in: 2 weeks  1. Independent in HEP and progression per patient tolerance, in order to prevent re-injury. 2. Patient will have a decrease in pain to facilitate improvement in movement, function, and ADLs as indicated by Functional Deficits.     Long Term Goals: To be achieved in: 8 weeks  1.  Disability index score of 10% or less for the LEFS to assist with reaching prior level of function. Progressing well  2. Patient will demonstrate increased AROM to WNL to allow for proper joint functioning as indicated by patients Functional Deficits. met  3. Patient will demonstrate an increase in Strength to good proximal hip strength and control in LE to allow for proper functional mobility as indicated by patients Functional Deficits. progressing well  4. Patient will return to ADL and household functional activities without increased symptoms or restriction. met  5. Pt will return to recreational activities including kneeling and squatting movements. partially met    Progression Towards Functional goals:  [x] Patient is progressing as expected towards functional goals listed. [] Progression is slowed due to complexities listed. [] Progression has been slowed due to co-morbidities. [] Plan just implemented, too soon to assess goals progression  [] Other:     ASSESSMENT:  Pt is doing well with the strengthening program. She needs to be more consistent with this at home. Pt has discomfort and joint stiffness with knee flexion positions. Treatment/Activity Tolerance:  [x] Patient tolerated treatment well [] Patient limited by fatique  [] Patient limited by pain  [] Patient limited by other medical complications  [] Other:     Patient education: HEP for stretching, strengthening, endurance, balance. Prognosis: [x] Good [] Fair  [] Poor    Patient Requires Follow-up: [x] Yes  [] No    PLAN: Strengthening, stretching, balance exercises. Cont 1x/week for 4 weeks.    [x] Continue per plan of care [] Alter current plan (see comments)  [] Plan of care initiated [] Hold pending MD visit [] Discharge    Electronically signed by: Rosi Huntley PT

## 2019-06-28 ENCOUNTER — HOSPITAL ENCOUNTER (OUTPATIENT)
Dept: PHYSICAL THERAPY | Age: 44
Setting detail: THERAPIES SERIES
Discharge: HOME | End: 2019-06-28
Payer: COMMERCIAL

## 2019-06-28 PROCEDURE — 97530 THERAPEUTIC ACTIVITIES: CPT | Performed by: PHYSICAL THERAPIST

## 2019-06-28 PROCEDURE — 97110 THERAPEUTIC EXERCISES: CPT | Performed by: PHYSICAL THERAPIST

## 2019-06-28 PROCEDURE — 97112 NEUROMUSCULAR REEDUCATION: CPT | Performed by: PHYSICAL THERAPIST

## 2019-06-28 NOTE — FLOWSHEET NOTE
[facility] [address] [place] [address] [facility]    Physical Therapy Daily Treatment Note  Date:  2019    Patient Name:  Selvin Londono    :  1975  MRN: [id number]  Restrictions/Precautions:    Medical/Treatment Diagnosis Information:  · Diagnosis: s/p Left knee A-scope    · DOS: 19  · Treatment Diagnosis: decreased ROM; decreased strength; increased swelling; occasional pain  Insurance/Certification information:  PT Insurance Information: [employer]  Physician Information:  Referring Practitioner: Cris Leung MD  Plan of care signed (Y/N):     Date of Patient follow up with Physician:     G-Code (if applicable):      Date G-Code Applied:  19  PT G-Codes  Functional Assessment Tool Used: LEFS  Score: 69%  Functional Limitation: Mobility: Walking and moving around  Mobility: Walking and Moving Around Current Status (): At least 20 percent but less than 40 percent impaired, limited or restricted  Mobility: Walking and Moving Around Goal Status (): 0 percent impaired, limited or restricted   Updated LEFS 4-3-19: 80%    Progress Note: [x]  Yes  []  No  Next due by: Visit #20       Latex Allergy:  [x]NO      []YES  Preferred Language for Healthcare:   [x]English       []other:    Visit # Insurance Allowable   12 25 hard max     Pain level:       SUBJECTIVE:  Pt states she has discomfort in the patella with muscle tightness/quad and hip regions. Pain with sitting and occasionally with lying down. Walking is good. Cross-legged sitting increases pain.      OBJECTIVE:   Observation: Quad/vmo= fair/fair-  Test measurements:    Knee flexion ROM= +2 to 142 degrees    RESTRICTIONS/PRECAUTIONS: OA    Exercises/Interventions:   Exercise/Equipment Resistance/Repetitions Other comments   Stretching     Hamstring [id number]    Towel Pull     Inclined Calf [id number]    Hip Flexor stretch     ITB     Groin     Quad Prone with strap [id number] Or standing   Tiger tail               SLR Reviewed/Progressed HEP activities related to strengthening, flexibility, endurance, ROM of core, proximal hip and LE for functional self-care, mobility, lifting and ambulation/stair navigation   [x] (91040)Reviewed/Progressed HEP activities related to improving balance, coordination, kinesthetic sense, posture, motor skill, proprioception of core, proximal hip and LE for self care, mobility, lifting, and ambulation/stair navigation      Manual Treatments:  PROM / STM / Oscillations-Mobs:  G-I, II, III, IV (PA's, Inf., Post.)  [] (39776) Provided manual therapy to mobilize LE, proximal hip and/or LS spine soft tissue/joints for the purpose of modulating pain, promoting relaxation,  increasing ROM, reducing/eliminating soft tissue swelling/inflammation/restriction, improving soft tissue extensibility and allowing for proper ROM for normal function with self care, mobility, lifting and ambulation. Modalities:  Ice x 15 min    Charges:  Timed Code Treatment Minutes: 56   Total Treatment Minutes: 71       [] EVAL (LOW) 15491 (typically 20 minutes face-to-face)  [] EVAL (MOD) 34752 (typically 30 minutes face-to-face)  [] EVAL (HIGH) 17144 (typically 45 minutes face-to-face)  [] RE-EVAL   [x] MQ(95397) x  2   [] IONTO  [x] NMR (57529) x  1   [] VASO  [] Manual (78848) x       [] Other:  [x] TA x  1    [] Mech Traction (59825)  [] ES(attended) (47051)      [] ES (un) (99148):      GOALS:  Patient stated goal: Flexibility. Less discomfort.      Therapist goals for Patient:   Short Term Goals: To be achieved in: 2 weeks  1. Independent in HEP and progression per patient tolerance, in order to prevent re-injury. 2. Patient will have a decrease in pain to facilitate improvement in movement, function, and ADLs as indicated by Functional Deficits.     Long Term Goals: To be achieved in: 8 weeks  1. Disability index score of 10% or less for the LEFS to assist with reaching prior level of function. Progressing well  2. Patient will demonstrate increased AROM to WNL to allow for proper joint functioning as indicated by patients Functional Deficits. met  3. Patient will demonstrate an increase in Strength to good proximal hip strength and control in LE to allow for proper functional mobility as indicated by patients Functional Deficits. progressing well  4. Patient will return to ADL and household functional activities without increased symptoms or restriction. met  5. Pt will return to recreational activities including kneeling and squatting movements. partially met    Progression Towards Functional goals:  [x] Patient is progressing as expected towards functional goals listed. [] Progression is slowed due to complexities listed. [] Progression has been slowed due to co-morbidities. [] Plan just implemented, too soon to assess goals progression  [] Other:     ASSESSMENT:  Pt is doing well with therapy. She has improved technique with VCs. Focus is on quad strengthening and stretching to relieve pressure on the PF joint. Pt has PF pain with flexion positions. Questionable compliance with a HEP or gym program. Pt must work on quad and LE strength to decrease pain and improve functional mobility. Possible DC at next visit. Continued HEP and gym program will be necessary to manage her PFA. Treatment/Activity Tolerance:  [x] Patient tolerated treatment well [] Patient limited by fatique  [] Patient limited by pain  [] Patient limited by other medical complications  [] Other:     Patient education: HEP for stretching, strengthening, endurance, balance. Prognosis: [x] Good [] Fair  [] Poor    Patient Requires Follow-up: [x] Yes  [] No    PLAN: Strengthening, stretching, balance exercises. Cont 1x/week for 4 weeks.    [x] Continue per plan of care [] Alter current plan (see comments)  [] Plan of care initiated [] Hold pending MD visit [] Discharge    Electronically signed by: Rebecca Osman PT

## 2019-07-05 ENCOUNTER — HOSPITAL ENCOUNTER (OUTPATIENT)
Dept: PHYSICAL THERAPY | Age: 44
Setting detail: THERAPIES SERIES
Discharge: HOME | End: 2019-07-05
Payer: COMMERCIAL

## 2019-07-05 PROCEDURE — 97112 NEUROMUSCULAR REEDUCATION: CPT

## 2019-07-05 PROCEDURE — 97110 THERAPEUTIC EXERCISES: CPT

## 2019-07-05 PROCEDURE — 97530 THERAPEUTIC ACTIVITIES: CPT

## 2019-07-05 NOTE — FLOWSHEET NOTE
[address] [facility]    Physical Therapy Daily Treatment Note  Date:  2019    Patient Name:  Akilah Durham    :  1975  MRN: [id number]  Restrictions/Precautions:    Medical/Treatment Diagnosis Information:  · Diagnosis: s/p Left knee A-scope    · DOS: 19  · Treatment Diagnosis: decreased ROM; decreased strength; increased swelling; occasional pain  Insurance/Certification information:  PT Insurance Information: [employer]  Physician Information:  Referring Practitioner: Guerda Meneses MD  Plan of care signed (Y/N):     Date of Patient follow up with Physician:     G-Code (if applicable):      Date G-Code Applied:  19  PT G-Codes  Functional Assessment Tool Used: LEFS  Score: 69%  Functional Limitation: Mobility: Walking and moving around  Mobility: Walking and Moving Around Current Status (): At least 20 percent but less than 40 percent impaired, limited or restricted  Mobility: Walking and Moving Around Goal Status (): 0 percent impaired, limited or restricted   Updated LEFS 4-3-19: 80%    Progress Note: [x]  Yes  []  No  Next due by: Visit #20       Latex Allergy:  [x]NO      []YES  Preferred Language for Healthcare:   [x]English       []other:    Visit # Insurance Allowable   13 25 hard max     Pain level: 0/10        SUBJECTIVE:    Pt states that she has continued tightness in her knee especially when she does not do her exercises.     OBJECTIVE:   Observation: Quad/vmo= fair/fair-  Test measurements:    Knee flexion ROM= +2 to 142 degrees    RESTRICTIONS/PRECAUTIONS: OA    Exercises/Interventions:   Exercise/Equipment Resistance/Repetitions Other comments   Stretching     Hamstring [id number]    Towel Pull     Inclined Calf [id number]    Hip Flexor stretch     ITB 30 sec x 5 Start    Groin     Quad Prone with strap [id number] Or standing   Tiger tail               SLR     Supine 3# 3x10 Abduction 3# 3x10 Adduction 3# 3x10 Prone 0# flexibility, endurance, ROM of core, proximal hip and LE for functional self-care, mobility, lifting and ambulation/stair navigation   [x] (05363)Reviewed/Progressed HEP activities related to improving balance, coordination, kinesthetic sense, posture, motor skill, proprioception of core, proximal hip and LE for self care, mobility, lifting, and ambulation/stair navigation      Manual Treatments:  PROM / STM / Oscillations-Mobs:  G-I, II, III, IV (PA's, Inf., Post.)  [] (73590) Provided manual therapy to mobilize LE, proximal hip and/or LS spine soft tissue/joints for the purpose of modulating pain, promoting relaxation,  increasing ROM, reducing/eliminating soft tissue swelling/inflammation/restriction, improving soft tissue extensibility and allowing for proper ROM for normal function with self care, mobility, lifting and ambulation. Modalities:  Ice x 10 min 7/5    Charges:  Timed Code Treatment Minutes: 56   Total Treatment Minutes: 75       [] EVAL (LOW) 64207 (typically 20 minutes face-to-face)  [] EVAL (MOD) 12476 (typically 30 minutes face-to-face)  [] EVAL (HIGH) 32667 (typically 45 minutes face-to-face)  [] RE-EVAL   [x] BB(20753) x  2   [] IONTO  [x] NMR (63559) x  1   [] VASO  [] Manual (01348) x       [] Other:  [x] TA x  1    [] Mech Traction (96182)  [] ES(attended) (38782)      [] ES (un) (88027):      GOALS:  Patient stated goal: Flexibility. Less discomfort.      Therapist goals for Patient:   Short Term Goals: To be achieved in: 2 weeks  1. Independent in HEP and progression per patient tolerance, in order to prevent re-injury. 2. Patient will have a decrease in pain to facilitate improvement in movement, function, and ADLs as indicated by Functional Deficits.     Long Term Goals: To be achieved in: 8 weeks  1. Disability index score of 10% or less for the LEFS to assist with reaching prior level of function. Progressing well  2.  Patient will demonstrate increased AROM to WNL to allow for proper joint functioning as indicated by patients Functional Deficits. met  3. Patient will demonstrate an increase in Strength to good proximal hip strength and control in LE to allow for proper functional mobility as indicated by patients Functional Deficits. progressing well  4. Patient will return to ADL and household functional activities without increased symptoms or restriction. met  5. Pt will return to recreational activities including kneeling and squatting movements. partially met    Progression Towards Functional goals:  [x] Patient is progressing as expected towards functional goals listed. [] Progression is slowed due to complexities listed. [] Progression has been slowed due to co-morbidities. [] Plan just implemented, too soon to assess goals progression  [] Other:     ASSESSMENT:  Pt is doing well with therapy. She has improved technique with VCs. Added ITB stretch this session to help with patient's complaints of lateral sided tightness. Continue to address quad strengthening and muscular stretching. Continued HEP and gym program will be necessary to manage her PFA. Treatment/Activity Tolerance:  [x] Patient tolerated treatment well [] Patient limited by fatique  [] Patient limited by pain  [] Patient limited by other medical complications  [] Other:     Patient education: HEP for stretching, strengthening, endurance, balance. Prognosis: [x] Good [] Fair  [] Poor    Patient Requires Follow-up: [x] Yes  [] No    PLAN: Strengthening, stretching, balance exercises. Cont 1x/week for 4 weeks.    [x] Continue per plan of care [] Alter current plan (see comments)  [] Plan of care initiated [] Hold pending MD visit [] Discharge    Electronically signed by: Connie John PT, DPT

## 2019-08-21 ENCOUNTER — HOSPITAL ENCOUNTER (OUTPATIENT)
Age: 44
Setting detail: OUTPATIENT SURGERY
Discharge: HOME | End: 2019-08-21
Attending: INTERNAL MEDICINE | Admitting: INTERNAL MEDICINE
Payer: COMMERCIAL

## 2019-08-21 VITALS
TEMPERATURE: 98.4 F | BODY MASS INDEX: 22.36 KG/M2 | RESPIRATION RATE: 16 BRPM | HEART RATE: 70 BPM | SYSTOLIC BLOOD PRESSURE: 102 MMHG | WEIGHT: 131 LBS | OXYGEN SATURATION: 100 % | DIASTOLIC BLOOD PRESSURE: 68 MMHG | HEIGHT: 64 IN

## 2019-08-21 PROCEDURE — 7100000010 HC PHASE II RECOVERY - FIRST 15 MIN: Performed by: INTERNAL MEDICINE

## 2019-08-21 PROCEDURE — 6370000000 HC RX 637 (ALT 250 FOR IP): Performed by: INTERNAL MEDICINE

## 2019-08-21 PROCEDURE — 3609012400 HC EGD TRANSORAL BIOPSY SINGLE/MULTIPLE: Performed by: INTERNAL MEDICINE

## 2019-08-21 PROCEDURE — 6360000002 HC RX W HCPCS: Performed by: INTERNAL MEDICINE

## 2019-08-21 PROCEDURE — 2709999900 HC NON-CHARGEABLE SUPPLY: Performed by: INTERNAL MEDICINE

## 2019-08-21 PROCEDURE — 7100000011 HC PHASE II RECOVERY - ADDTL 15 MIN: Performed by: INTERNAL MEDICINE

## 2019-08-21 PROCEDURE — 88305 TISSUE EXAM BY PATHOLOGIST: CPT

## 2019-08-21 PROCEDURE — 99152 MOD SED SAME PHYS/QHP 5/>YRS: CPT | Performed by: INTERNAL MEDICINE

## 2019-08-21 RX ORDER — TRIAMTERENE AND HYDROCHLOROTHIAZIDE 37.5; 25 MG/1; MG/1
TABLET ORAL
Refills: 0 | COMMUNITY
Start: 2019-05-23

## 2019-08-21 RX ORDER — MEPERIDINE HYDROCHLORIDE 50 MG/ML
INJECTION INTRAMUSCULAR; INTRAVENOUS; SUBCUTANEOUS PRN
Status: DISCONTINUED | OUTPATIENT
Start: 2019-08-21 | End: 2019-08-21 | Stop reason: ALTCHOICE

## 2019-08-21 RX ORDER — MIDAZOLAM HYDROCHLORIDE 1 MG/ML
INJECTION INTRAMUSCULAR; INTRAVENOUS PRN
Status: DISCONTINUED | OUTPATIENT
Start: 2019-08-21 | End: 2019-08-21 | Stop reason: ALTCHOICE

## 2019-08-21 RX ORDER — ESOMEPRAZOLE MAGNESIUM 40 MG/1
40 FOR SUSPENSION ORAL DAILY
COMMUNITY

## 2019-08-21 ASSESSMENT — PAIN - FUNCTIONAL ASSESSMENT
PAIN_FUNCTIONAL_ASSESSMENT: 0-10

## 2019-08-21 NOTE — BRIEF OP NOTE
Brief Postoperative Note  ______________________________________________________________    Patient: Jos Hopson  YOB: 1975  MRN: [id number]  Date of Procedure: 8/21/2019    Pre-Op Diagnosis: GERD K21.9    Post-Op Diagnosis: Same       Procedure(s):  ESOPHAGOGASTRODUODENOSCOPY BIOPSY    Anesthesia: Anesthesia type not filed in the log.     Surgeon(s):  Erika Godinez MD    Assistant:     Estimated Blood Loss (mL): less than 50     Complications: None    Specimens:   ID Type Source Tests Collected by Time Destination   A : small bowel biopsy  Tissue Duodenum SURGICAL PATHOLOGY Erika Godinez MD 8/21/2019 1144    B : gastric for Hpylori  Tissue Stomach SURGICAL PATHOLOGY Erika Godinez MD 8/21/2019 1144    C : esophagus for reflux  Respiratory Esophagus SURGICAL PATHOLOGY Erika Godinez MD 8/21/2019 1145        Implants:  * No implants in log *      Drains: * No LDAs found *    Findings: gerd    Roxana Bey MD  Date: 8/21/2019  Time: 11:46 AM

## 2019-08-21 NOTE — PROGRESS NOTES
Lindsay Greco is a 39 y.o. female patient. No current facility-administered medications for this encounter. Allergies   Allergen Reactions    No Known Allergies      Active Problems:    * No active hospital problems. *  Resolved Problems:    * No resolved hospital problems. *    Blood pressure 122/79, pulse 93, temperature 98.4 °F (36.9 °C), temperature source Oral, resp. rate 16, height 5' 4\" (1.626 m), weight 131 lb (59.4 kg), SpO2 99 %. Subjective:  Symptoms:  (Patient with history of reflux despite medication. ). Diet:  NPO. Activity level: Normal.    Pain:  She reports no pain. Objective:  General Appearance:  Well-appearing and comfortable. Vital signs: (most recent): Blood pressure 122/79, pulse 93, temperature 98.4 °F (36.9 °C), temperature source Oral, resp. rate 16, height 5' 4\" (1.626 m), weight 131 lb (59.4 kg), SpO2 99 %. Vital signs are normal.    Output: Producing urine and producing stool. Lungs:  Normal effort. Heart: Normal rate. Abdomen: Abdomen is soft. Neurological: Patient is alert and oriented to person, place and time.       Assessment & Plan    [place] RN  8/21/2019

## 2019-10-29 ENCOUNTER — HOSPITAL ENCOUNTER (OUTPATIENT)
Dept: MAMMOGRAPHY | Age: 44
Discharge: HOME | End: 2019-10-29
Payer: COMMERCIAL

## 2019-10-29 DIAGNOSIS — Z12.31 VISIT FOR SCREENING MAMMOGRAM: ICD-10-CM

## 2019-10-29 PROCEDURE — 77063 BREAST TOMOSYNTHESIS BI: CPT

## 2020-02-11 ENCOUNTER — HOSPITAL ENCOUNTER (OUTPATIENT)
Dept: PHYSICAL THERAPY | Age: 45
Setting detail: THERAPIES SERIES
Discharge: HOME | End: 2020-02-11
Payer: COMMERCIAL

## 2020-02-11 PROCEDURE — 97161 PT EVAL LOW COMPLEX 20 MIN: CPT | Performed by: PHYSICAL THERAPIST

## 2020-02-11 PROCEDURE — 97110 THERAPEUTIC EXERCISES: CPT | Performed by: PHYSICAL THERAPIST

## 2020-02-11 NOTE — PLAN OF CARE
[facility]  [phone]                                                       Physical Therapy Certification    Dear Referring Practitioner: Dheeraj Barrow MD,    We had the pleasure of evaluating the following patient for physical therapy services at [address] [place] [address]. A summary of our findings can be found in the initial assessment below. This includes our plan of care. If you have any questions or concerns regarding these findings, please do not hesitate to contact me at the office phone number checked above. Thank you for the referral.       Physician Signature:_______________________________Date:__________________  By signing above (or electronic signature), therapists plan is approved by physician      Patient: Maciel Graham   : 1975   MRN: [id number]  Referring Physician: Referring Practitioner: Dheeraj Barrow MD      Evaluation Date: 2020      Medical Diagnosis Information:  Diagnosis: Left knee PF pain with OA   Treatment Diagnosis: decreased strength; increased pain; increased swelling                                         Insurance information: PT Insurance Information: [employer]     Precautions/ Contra-indications: Left knee PF OA  Latex Allergy:  [x]NO      []YES  Preferred Language for Healthcare:   [x]English       []other:    SUBJECTIVE: Patient stated complaint: Pt had left knee surgery one year ago. She recently returned to the MD secondary to constant, dull aching pain in the left knee. This began approx 6 months after the surgery. The MRI shows scar tissue and inflammation. Pt received a cortisone injection last month which provided some relief. She takes Aleve. Pt has pain with prolonged positions of knee flexion and then extending the knee. Pain is located around the patella and at the portal sites. She notes swelling.  Pain is limiting her activity level.      Relevant Medical History: Left knee surgery 2019; HBP  Functional Disability Index: LEFS= 75%    Pain Scale: 6/10  Easing factors:   Provocative factors: sleeping, kneeling, sitting, squatting    Type: [x]Constant   []Intermittent  []Radiating []Localized []other:     Numbness/Tingling:     Occupation/School:  FTE, desk work    Living Status/Prior Level of Function: Independent with ADLs and IADLs    OBJECTIVE:      Flexibility L R Comment   Hamstring moderate     Gastroc      ITB      Quad              ROM PROM AROM Overpressure Comment    L R L R L R    Flexion WNL  WNL       Extension   0                               Strength L R Comment   Quad fair     Hamstring      Gastroc      Hip  flexion      Hip abd                      Special Test Results/Comment   Meniscal Click    Crepitus    Flexion Test    Valgus Laxity    Varus Laxity    Lachmans    Drop Back    Homans            Girth L R   Mid Patella     Suprapatellar     5cm above     15cm above       Reflexes/Sensation:    []Dermatomes/Myotomes intact    []Reflexes equal and normal bilaterally   []Other:    Joint mobility: PF   []Normal    [x]Hypo   []Hyper    Palpation: scar tissue at fat pads    Functional Mobility/Transfers: WNL    Posture:     Bandages/Dressings/Incisions: NA    Gait:WNL    Orthopedic Special Tests: see above objective information                       [x] Patient history, allergies, meds reviewed. Medical chart reviewed. See intake form. Review Of Systems (ROS):  [x]Performed Review of systems (Integumentary, CardioPulmonary, Neurological) by intake and observation. Intake form has been scanned into medical record. Patient has been instructed to contact their primary care physician regarding ROS issues if not already being addressed at this time.       Co-morbidities/Complexities (which will affect course of rehabilitation):   []None           Arthritic conditions   []Rheumatoid arthritis (M05.9)  []Osteoarthritis functions (impairments), activity limitations, and/or participation restrictions;:  [] a total of 1-2 or more elements   [x] a total of 3 or more elements   [] a total of 4 or more elements   [x] A clinical presentation with:  [x] stable and/or uncomplicated characteristics   [] evolving clinical presentation with changing characteristics  [] unstable and unpredictable characteristics;   [x] Clinical decision making of [x] low, [] moderate, [] high complexity using standardized patient assessment instrument and/or measurable assessment of functional outcome. [x] EVAL (LOW) 65204 (typically 20 minutes face-to-face)  [] EVAL (MOD) 06493 (typically 30 minutes face-to-face)  [] EVAL (HIGH) 07756 (typically 45 minutes face-to-face)  [] RE-EVAL       PLAN  Frequency/Duration:  1-2 days per week for 8 Weeks:  Interventions:  [x]  Therapeutic exercise including: strength training, ROM, for Lower extremity and core   [x]  NMR activation and proprioception for LE, Glutes and Core   [x]  Manual therapy as indicated for LE, Hip and spine to include: Dry Needling/IASTM, STM, PROM, Gr I-IV mobilizations, manipulation. [x] Modalities as needed that may include: thermal agents, E-stim, Biofeedback, US, iontophoresis as indicated  [x] Patient education on joint protection, postural re-education, activity modification, progression of HEP. HEP instruction: Pt received a written HEP today. (see scanned forms)    GOALS:   Patient stated goal: Less pain. More flexibility. [] Progressing: [] Met: [] Not Met: [] Adjusted    Therapist goals for Patient:   Short Term Goals: To be achieved in: 2 weeks  1. Independent in HEP and progression per patient tolerance, in order to prevent re-injury. [] Progressing: [] Met: [] Not Met: [] Adjusted   2. Patient will have a decrease in pain to facilitate improvement in movement, function, and ADLs as indicated by Functional Deficits.     [] Progressing: [] Met: [] Not Met: []

## 2020-02-11 NOTE — FLOWSHEET NOTE
[address] [facility]    [facility] Therapy Daily Treatment Note  Date:  2020    Patient Name:  Iva Schofield    :  1975  MRN: [id number]  Restrictions/Precautions:    Medical/Treatment Diagnosis Information:  · Diagnosis: Left knee PF pain with OA  · Treatment Diagnosis: decreased strength; increased pain; increased swelling  Insurance/Certification information:  PT Insurance Information: [employer]  Physician Information:  Referring Practitioner: Tyrone Garrett MD  Has the plan of care been signed (Y/N):        []  Yes  [x]  No     Date of Patient follow up with Physician:       Is this a Progress Report:     []  Yes  [x]  No        If Yes:  Date Range for reporting period:  Beginning  Ending    Progress report will be due (10 Rx or 30 days whichever is less):       Recertification will be due (POC Duration  / 90 days whichever is less): 20        Visit # Insurance Allowable Auth Required   1 25 hard max []  Yes []  No        Functional Scale: LEFS= 75%   Date assessed: 20      Latex Allergy:  [x]NO      []YES  Preferred Language for Healthcare:   [x]English       []other:    Pain level:  6/10     SUBJECTIVE:  See eval    OBJECTIVE: See eval   Observation:    Test measurements:      RESTRICTIONS/PRECAUTIONS: Left knee PF OA    Exercises/Interventions:   Exercise/Equipment Resistance/Repetitions Other comments   Stretching     Hamstring [id number]    Towel Pull     Inclined Calf     Hip Flexion     ITB     Groin     Quad                    SLR     Supine 2# 3x10    Abduction Clam shell 3x10    Adduction 0# 3x10     Prone     SLR+          Isometrics     Quad sets          Patellar Glides     Medial 2'    Superior 2'    Inferior 2'         ROM     Sheet Pulls     Hang Weights     Passive     Active     Weight Shift     Ankle Pumps                    CKC     Calf raises     Wall sits     Step ups     1 leg stand     Squatting     CC TKE Balance     Bridges 3x10         PRE     Extension  RANGE:   Flexion  RANGE:        Quantum machines     Leg press      Leg extension     Leg curl          Manual interventions     IASTM 5 min fat pads               Therapeutic Exercise and NMR EXR  [x] (76054) Provided verbal/tactile cueing for activities related to strengthening, flexibility, endurance, ROM for improvements in LE, proximal hip, and core control with self care, mobility, lifting, ambulation.  [] (66451) Provided verbal/tactile cueing for activities related to improving balance, coordination, kinesthetic sense, posture, motor skill, proprioception  to assist with LE, proximal hip, and core control in self care, mobility, lifting, ambulation and eccentric single leg control.      NMR and Therapeutic Activities:    [] (21829 or 61111) Provided verbal/tactile cueing for activities related to improving balance, coordination, kinesthetic sense, posture, motor skill, proprioception and motor activation to allow for proper function of core, proximal hip and LE with self care and ADLs  [] (30155) Gait Re-education- Provided training and instruction to the patient for proper LE, core and proximal hip recruitment and positioning and eccentric body weight control with ambulation re-education including up and down stairs     Home Exercise Program:    [x] (68166) Reviewed/Progressed HEP activities related to strengthening, flexibility, endurance, ROM of core, proximal hip and LE for functional self-care, mobility, lifting and ambulation/stair navigation   [] (02759)Reviewed/Progressed HEP activities related to improving balance, coordination, kinesthetic sense, posture, motor skill, proprioception of core, proximal hip and LE for self care, mobility, lifting, and ambulation/stair navigation      Manual Treatments:  PROM / STM / Oscillations-Mobs:  G-I, II, III, IV (PA's, Inf., Post.)  [] (50107) Provided manual therapy to mobilize LE, proximal hip and/or LS spine soft tissue/joints for the purpose of modulating pain, promoting relaxation,  increasing ROM, reducing/eliminating soft tissue swelling/inflammation/restriction, improving soft tissue extensibility and allowing for proper ROM for normal function with self care, mobility, lifting and ambulation. Modalities:   Ice x 15 min    Charges:  Timed Code Treatment Minutes: 20   Total Treatment Minutes: 60       [x] EVAL (LOW) 65804 (typically 20 minutes face-to-face)  [] EVAL (MOD) 08028 (typically 30 minutes face-to-face)  [] EVAL (HIGH) 39391 (typically 45 minutes face-to-face)  [] RE-EVAL   [x] CM(22770) x  1   [] IONTO  [] NMR (91049) x     [] VASO  [] Manual (12852) x      [] Other:  [] TA x      [] Mech Traction (48252)  [] ES(attended) (87831)      [] ES (un) (20231):     GOALS:   Patient stated goal: Less pain. More flexibility. []? Progressing: []? Met: []? Not Met: []? Adjusted     Therapist goals for Patient:   Short Term Goals: To be achieved in: 2 weeks  1. Independent in HEP and progression per patient tolerance, in order to prevent re-injury. []? Progressing: []? Met: []? Not Met: []? Adjusted   2. Patient will have a decrease in pain to facilitate improvement in movement, function, and ADLs as indicated by Functional Deficits. []? Progressing: []? Met: []? Not Met: []? Adjusted     Long Term Goals: To be achieved in: 8 weeks  1. Disability index score of 20% or less for the LEFS to assist with reaching prior level of function. []? Progressing: []? Met: []? Not Met: []? Adjusted  2. Patient will demonstrate increased AROM to WNL to allow for proper joint functioning as indicated by patients Functional Deficits. []? Progressing: []? Met: []? Not Met: []? Adjusted  3. Patient will demonstrate an increase in Strength to good proximal hip strength and control, within 5lb HHD in LE to allow for proper functional mobility as indicated by patients Functional Deficits. []?  Progressing: []? Met: []? Not Met: []? Adjusted  4. Patient will return to ADL and household functional activities without increased symptoms or restriction. []? Progressing: []? Met: []? Not Met: []? Adjusted  5. Pt will have decreased pain. []? Progressing: []? Met: []? Not Met: []? Adjusted       Overall Progression Towards Functional goals/ Treatment Progress Update:  [] Patient is progressing as expected towards functional goals listed. [] Progression is slowed due to complexities/Impairments listed. [] Progression has been slowed due to co-morbidities. [x] Plan just implemented, too soon to assess goals progression <30days   [] Goals require adjustment due to lack of progress  [] Patient is not progressing as expected and requires additional follow up with physician  [] Other    Prognosis for POC: [x] Good [] Fair  [] Poor      Patient requires continued skilled intervention: [x] Yes  [] No    Treatment/Activity Tolerance:  [] Patient able to complete treatment  [x] Patient limited by fatigue  [x] Patient limited by pain     [] Patient limited by other medical complications  [x] Other: Pt has PF pain due to OA and fat pad scar tissue. She has decreased quad strength which is contributing to her PF pain. Pt will benefit from a PT program for joint mobilization, IASTM and exercises for flexibility and strengthening. PLAN: See eval  [] Continue per plan of care [] Alter current plan (see comments above)  [x] Plan of care initiated [] Hold pending MD visit [] Discharge      Electronically signed by:  Nuria Valadez, PT    Note: If patient does not return for scheduled/ recommended follow up visits, this note will serve as a discharge from care along with most recent update on progress.

## 2020-02-18 ENCOUNTER — HOSPITAL ENCOUNTER (OUTPATIENT)
Dept: PHYSICAL THERAPY | Age: 45
Setting detail: THERAPIES SERIES
Discharge: HOME | End: 2020-02-18
Payer: COMMERCIAL

## 2020-02-18 ENCOUNTER — APPOINTMENT (OUTPATIENT)
Dept: PHYSICAL THERAPY | Age: 45
End: 2020-02-18
Payer: COMMERCIAL

## 2020-02-18 PROCEDURE — 97530 THERAPEUTIC ACTIVITIES: CPT | Performed by: PHYSICAL THERAPIST

## 2020-02-18 PROCEDURE — 97110 THERAPEUTIC EXERCISES: CPT | Performed by: PHYSICAL THERAPIST

## 2020-02-18 NOTE — FLOWSHEET NOTE
[facility] [address] [place] [address] [facility]    [facility] Therapy Daily Treatment Note  Date:  2020    Patient Name:  Lilli Schaeffer    :  1975  MRN: [id number]  Restrictions/Precautions:    Medical/Treatment Diagnosis Information:  · Diagnosis: Left knee PF pain with OA  · Treatment Diagnosis: decreased strength; increased pain; increased swelling  Insurance/Certification information:  PT Insurance Information: [employer]  Physician Information:  Referring Practitioner: Norberto Coon MD  Has the plan of care been signed (Y/N):        []  Yes  [x]  No     Date of Patient follow up with Physician:       Is this a Progress Report:     []  Yes  [x]  No        If Yes:  Date Range for reporting period:  Beginning  Ending    Progress report will be due (10 Rx or 30 days whichever is less): 3/39/80      Recertification will be due (POC Duration  / 90 days whichever is less): 20        Visit # Insurance Allowable Auth Required   2 25 hard max []  Yes []  No        Functional Scale: LEFS= 75%   Date assessed: 20      Latex Allergy:  [x]NO      []YES  Preferred Language for Healthcare:   [x]English       []other:    Pain level:       SUBJECTIVE:  Pt states she felt the exercises did help. No significant changes to report today.      OBJECTIVE:    Observation:    Test measurements:      RESTRICTIONS/PRECAUTIONS: Left knee PF OA    Exercises/Interventions:   Exercise/Equipment Resistance/Repetitions Other comments   Stretching     Hamstring [id number]    Towel Pull     Inclined Calf Next visit    Hip Flexion     ITB     Groin     Quad                    SLR     Supine 2# 3x10    Abduction Clam shell 3x10    Adduction 2# 3x10     Prone     SLR+ [id number]         Isometrics     Quad sets          Patellar Glides     Medial Resume   Superior next   Inferior visit        ROM     Sheet Pulls     Hang Weights     Passive     Active     Weight Shift     Ankle Pumps                    CKC Calf raises     Wall sits Next visit    Step ups     1 leg stand [id number]    Squatting     CC TKE     Balance     Bridges 3x10  With foam roll         PRE     Extension  RANGE:   Flexion  RANGE:        Quantum machines     Leg press  60# 3x10 0-90 degrees   Leg extension     Leg curl          Manual interventions     IASTM  Resume next visit              Therapeutic Exercise and NMR EXR  [x] (83049) Provided verbal/tactile cueing for activities related to strengthening, flexibility, endurance, ROM for improvements in LE, proximal hip, and core control with self care, mobility, lifting, ambulation.  [] (34697) Provided verbal/tactile cueing for activities related to improving balance, coordination, kinesthetic sense, posture, motor skill, proprioception  to assist with LE, proximal hip, and core control in self care, mobility, lifting, ambulation and eccentric single leg control.      NMR and Therapeutic Activities:    [x] (33115 or 60776) Provided verbal/tactile cueing for activities related to improving balance, coordination, kinesthetic sense, posture, motor skill, proprioception and motor activation to allow for proper function of core, proximal hip and LE with self care and ADLs  [] (27368) Gait Re-education- Provided training and instruction to the patient for proper LE, core and proximal hip recruitment and positioning and eccentric body weight control with ambulation re-education including up and down stairs     Home Exercise Program:    [x] (18282) Reviewed/Progressed HEP activities related to strengthening, flexibility, endurance, ROM of core, proximal hip and LE for functional self-care, mobility, lifting and ambulation/stair navigation   [x] (36172)Reviewed/Progressed HEP activities related to improving balance, coordination, kinesthetic sense, posture, motor skill, proprioception of core, proximal hip and LE for self care, mobility, lifting, and ambulation/stair navigation      Manual Treatments:  PROM / control, within 5lb HHD in LE to allow for proper functional mobility as indicated by patients Functional Deficits. []? Progressing: []? Met: []? Not Met: []? Adjusted  4. Patient will return to ADL and household functional activities without increased symptoms or restriction. []? Progressing: []? Met: []? Not Met: []? Adjusted  5. Pt will have decreased pain. []? Progressing: []? Met: []? Not Met: []? Adjusted       Overall Progression Towards Functional goals/ Treatment Progress Update:  [] Patient is progressing as expected towards functional goals listed. [] Progression is slowed due to complexities/Impairments listed. [] Progression has been slowed due to co-morbidities. [x] Plan just implemented, too soon to assess goals progression <30days   [] Goals require adjustment due to lack of progress  [] Patient is not progressing as expected and requires additional follow up with physician  [] Other    Prognosis for POC: [x] Good [] Fair  [] Poor      Patient requires continued skilled intervention: [x] Yes  [] No    Treatment/Activity Tolerance:  [] Patient able to complete treatment  [x] Patient limited by fatigue  [x] Patient limited by pain     [] Patient limited by other medical complications  [x] Other: Pt notes improvement with therapy exercises for strengthening. She tolerated the new exercises well. Pt requires a quad strengthening program and PF joint mobility to decrease pain and improve function. PLAN: Strengthening, patellar mobilization. [x] Continue per plan of care [] Alter current plan (see comments above)  [] Plan of care initiated [] Hold pending MD visit [] Discharge      Electronically signed by:  Robin Hull PT    Note: If patient does not return for scheduled/ recommended follow up visits, this note will serve as a discharge from care along with most recent update on progress.

## 2020-02-25 ENCOUNTER — HOSPITAL ENCOUNTER (OUTPATIENT)
Dept: PHYSICAL THERAPY | Age: 45
Setting detail: THERAPIES SERIES
Discharge: HOME | End: 2020-02-25
Payer: COMMERCIAL

## 2020-02-25 NOTE — FLOWSHEET NOTE
[facility] [place] [facility] [address] [facility]    Physical Therapy  Cancellation/No-show Note  Patient Name:  Tramaine Purcell  :  1975   Date:  2020  Cancelled visits to date: 0  No-shows to date: 0    For today's appointment patient:  [x]  Cancelled  []  Rescheduled appointment  []  No-show     Reason given by patient:  []  Patient ill  []  Conflicting appointment   []  No transportation    [x]  Conflict with work  []  No reason given  []  Other:     Comments:      Electronically signed by:  Feliciano Santana PT

## 2020-02-28 ENCOUNTER — HOSPITAL ENCOUNTER (OUTPATIENT)
Dept: PHYSICAL THERAPY | Age: 45
Setting detail: THERAPIES SERIES
Discharge: HOME | End: 2020-02-28
Payer: COMMERCIAL

## 2020-02-28 PROCEDURE — 97530 THERAPEUTIC ACTIVITIES: CPT | Performed by: PHYSICAL THERAPIST

## 2020-02-28 PROCEDURE — 97110 THERAPEUTIC EXERCISES: CPT | Performed by: PHYSICAL THERAPIST

## 2020-02-28 NOTE — FLOWSHEET NOTE
The [address] [place] [address] [facility], [facility]    Physical Therapy Daily Treatment Note  Date:  2020    Patient Name:  Tiffany Weeks    :  1975  MRN: [id number]  Restrictions/Precautions:    Medical/Treatment Diagnosis Information:  · Diagnosis: Left knee PF pain with OA  · Treatment Diagnosis: decreased strength; increased pain; increased swelling  Insurance/Certification information:  PT Insurance Information: [employer]  Physician Information:  Referring Practitioner: Emilie Henry MD  Has the plan of care been signed (Y/N):        []  Yes  [x]  No     Date of Patient follow up with Physician:       Is this a Progress Report:     []  Yes  [x]  No        If Yes:  Date Range for reporting period:  Beginning  Ending    Progress report will be due (10 Rx or 30 days whichever is less):       Recertification will be due (POC Duration  / 90 days whichever is less): 20        Visit # Insurance Allowable Auth Required   3 25 hard max []  Yes []  No        Functional Scale: LEFS= 75%   Date assessed: 20      Latex Allergy:  [x]NO      []YES  Preferred Language for Healthcare:   [x]English       []other:    Pain level:       SUBJECTIVE:  Pt states that her knee does note some improvement since IE but feels about the same as last week.      OBJECTIVE:    Observation:    Test measurements:      RESTRICTIONS/PRECAUTIONS: Left knee PF OA    Exercises/Interventions:   Exercise/Equipment Resistance/Repetitions Other comments   Stretching     Hamstring [id number]    Towel Pull     Inclined Calf [id number]    Hip Flexion     ITB     Groin     Quad                    SLR     Supine 2# 3x10    Abduction Clam shell 3x10    Adduction 2# 3x10     Prone     SLR+ [id number]         Isometrics     Quad sets          Patellar Glides     Medial 2'    Superior 2'    Inferior 2'         ROM     Sheet Pulls     Hang Weights     Passive     Active     Weight Shift     Ankle Pumps                    CKC

## 2020-03-03 ENCOUNTER — APPOINTMENT (OUTPATIENT)
Dept: PHYSICAL THERAPY | Age: 45
End: 2020-03-03
Payer: COMMERCIAL

## 2020-03-13 ENCOUNTER — HOSPITAL ENCOUNTER (OUTPATIENT)
Dept: PHYSICAL THERAPY | Age: 45
Setting detail: THERAPIES SERIES
Discharge: HOME | End: 2020-03-13
Payer: COMMERCIAL

## 2020-03-13 PROCEDURE — 97110 THERAPEUTIC EXERCISES: CPT | Performed by: PHYSICAL THERAPIST

## 2020-03-13 PROCEDURE — 97140 MANUAL THERAPY 1/> REGIONS: CPT | Performed by: PHYSICAL THERAPIST

## 2020-03-13 PROCEDURE — 97530 THERAPEUTIC ACTIVITIES: CPT | Performed by: PHYSICAL THERAPIST

## 2020-03-13 NOTE — PROGRESS NOTES
hip strength and control, within 5lb HHD in LE to allow for proper functional mobility as indicated by patients Functional Deficits. [x]? Progressing: []? Met: []? Not Met: []? Adjusted  4. Patient will return to ADL and household functional activities without increased symptoms or restriction. [x]? Progressing: []? Met: []? Not Met: []? Adjusted  5. Pt will have decreased pain. [x]? Progressing: []? Met: []? Not Met: []? Adjusted       Overall Progression Towards Functional goals/ Treatment Progress Update:  [x] Patient is progressing as expected towards functional goals listed. [] Progression is slowed due to complexities/Impairments listed. [] Progression has been slowed due to co-morbidities. [] Plan just implemented, too soon to assess goals progression <30days   [] Goals require adjustment due to lack of progress  [] Patient is not progressing as expected and requires additional follow up with physician  [] Other    Prognosis for POC: [x] Good [] Fair  [] Poor      Patient requires continued skilled intervention: [x] Yes  [] No     Treatment/Activity Tolerance:  [] Patient able to complete treatment  [x] Patient limited by fatigue  [x] Patient limited by pain     [] Patient limited by other medical complications  [x] Other: Patient notes improvement since initiating therapy. The manual therapy is helping with PF problems. She requires a continued strengthening program to include NM training. PLAN: Strengthening, patellar mobilization, manual therapy. [x] Continue per plan of care [] Alter current plan (see comments above)  [] Plan of care initiated [] Hold pending MD visit [] Discharge      Electronically signed by:  Randa Rao PT, DPT    Note: If patient does not return for scheduled/ recommended follow up visits, this note will serve as a discharge from care along with most recent update on progress.

## 2020-03-20 ENCOUNTER — HOSPITAL ENCOUNTER (OUTPATIENT)
Dept: PHYSICAL THERAPY | Age: 45
Setting detail: THERAPIES SERIES
Discharge: HOME | End: 2020-03-20
Payer: COMMERCIAL

## 2020-03-20 NOTE — FLOWSHEET NOTE
The [facility]    Physical Therapy  Cancellation/No-show Note  Patient Name:  Geremias Siddiqui  :  1975   Date:  3/20/2020  Cancelled visits to date: 0  No-shows to date: 0    For today's appointment patient:  [x]  Cancelled  []  Rescheduled appointment  []  No-show     Reason given by patient:  [x]  Patient ill  []  Conflicting appointment   []  No transportation    []  Conflict with work  []  No reason given  []  Other:     Comments:      Electronically signed by:  Lorenzo Delvalle PT

## 2020-11-02 ENCOUNTER — HOSPITAL ENCOUNTER (OUTPATIENT)
Dept: MAMMOGRAPHY | Age: 45
Discharge: HOME | End: 2020-11-02
Payer: COMMERCIAL

## 2020-11-02 PROCEDURE — 77063 BREAST TOMOSYNTHESIS BI: CPT

## 2021-09-07 ENCOUNTER — OFFICE VISIT (OUTPATIENT)
Dept: ENT CLINIC | Age: 46
End: 2021-09-07
Payer: COMMERCIAL

## 2021-09-07 VITALS
SYSTOLIC BLOOD PRESSURE: 133 MMHG | WEIGHT: 132 LBS | HEART RATE: 88 BPM | BODY MASS INDEX: 22.53 KG/M2 | HEIGHT: 64 IN | DIASTOLIC BLOOD PRESSURE: 95 MMHG

## 2021-09-07 DIAGNOSIS — J34.3 NASAL TURBINATE HYPERTROPHY: Primary | ICD-10-CM

## 2021-09-07 DIAGNOSIS — J34.3 NASAL TURBINATE HYPERTROPHY: ICD-10-CM

## 2021-09-07 DIAGNOSIS — J34.89 NASAL OBSTRUCTION: ICD-10-CM

## 2021-09-07 PROCEDURE — 99203 OFFICE O/P NEW LOW 30 MIN: CPT | Performed by: OTOLARYNGOLOGY

## 2021-09-07 PROCEDURE — 31231 NASAL ENDOSCOPY DX: CPT | Performed by: OTOLARYNGOLOGY

## 2021-09-07 RX ORDER — FLUTICASONE PROPIONATE 50 MCG
2 SPRAY, SUSPENSION (ML) NASAL DAILY
Qty: 32 G | Refills: 0 | Status: SHIPPED | OUTPATIENT
Start: 2021-09-07 | End: 2021-09-07

## 2021-09-07 RX ORDER — FLUTICASONE PROPIONATE 50 MCG
SPRAY, SUSPENSION (ML) NASAL
Qty: 48 G | Refills: 5 | Status: SHIPPED | OUTPATIENT
Start: 2021-09-07 | End: 2021-11-08

## 2021-09-07 ASSESSMENT — ENCOUNTER SYMPTOMS
SORE THROAT: 0
SHORTNESS OF BREATH: 0
EYE DISCHARGE: 0
DIARRHEA: 0
COLOR CHANGE: 0
VOMITING: 0
SINUS PRESSURE: 0
STRIDOR: 0
NAUSEA: 0
VOICE CHANGE: 0
RHINORRHEA: 0
WHEEZING: 0
COUGH: 0
CHEST TIGHTNESS: 0
APNEA: 0
CHOKING: 0
SINUS PAIN: 0
TROUBLE SWALLOWING: 0
BACK PAIN: 0
FACIAL SWELLING: 0
EYE PAIN: 0
BLOOD IN STOOL: 0
CONSTIPATION: 0

## 2021-09-07 NOTE — PROGRESS NOTES
Subjective:      Patient ID: Nazario Reddy is a 54 y.o. female. HPI  Chief Complaint   Patient presents with    Sinus pressure. History of Present Illness:Marilyn is a(n) 54 y.o. female who presents with sinus pressure and congestion, right. Started after Covid with severe sinus infection. All cleared except pressure and congestion. Was on dose elizabeth and erythro. Still on antibiotics. Nasal Discharge: none  Nasal Obstruction: Yes right; constant  Post Nasal Drainage: No  Nasal Bleeding: No  Sense of Smell: normal  Allergy Symptoms:  Patient complains of a 1 month history of moderate nasal congestion. She denies purulent nasal discharge and sputum, fevers, lymphadenopathy, and sore throat. These symptoms are since Covid. Current triggers include: none. She has tried oral steroid- dose elizabeth, antibiotics. Immunotherapy has never been tried. The patient has no history of asthma. .  The patient has no history of eczema. .  The patient does not suffer from frequent sinopulmonary infections. .  She has not had sinus surgery in the past. Symptoms show no change over time. Current allergist:  No.  History of Facial/Head Trauma: No  Pain/Discomfort:No  Headaches: none  Aspirin Sensitivity: No  Eye Symptoms: none  Previous Treatments: none         Patient Active Problem List   Diagnosis    Chondromalacia patellae of right knee    Right knee pain    Hypertrophy of infrapatellar fat pad left knee    Chondromalacia patellae of left knee    Left knee pain    Plica syndrome of left knee    Primary osteoarthritis of left knee     Past Surgical History:   Procedure Laterality Date    HYSTERECTOMY      KNEE ARTHROSCOPY      MYOMECTOMY  09, 11, 13    UPPER GASTROINTESTINAL ENDOSCOPY N/A 8/21/2019    ESOPHAGOGASTRODUODENOSCOPY BIOPSY performed by Philip Emery MD at [facility] ENDOSCOPY     No family history on file.     Social History     Socioeconomic History    Marital status: Single     Spouse name: Not on file  Number of children: Not on file    Years of education: Not on file    Highest education level: Not on file   Occupational History    Not on file   Tobacco Use    Smoking status: Never Smoker    Smokeless tobacco: Never Used   Substance and Sexual Activity    Alcohol use: Yes     Alcohol/week: 0.0 standard drinks     Comment: socially    Drug use: Not Currently    Sexual activity: Not on file   Other Topics Concern    Not on file   Social History Narrative    Not on file     Social Determinants of Health     Financial Resource Strain:     Difficulty of Paying Living Expenses:    Food Insecurity:     Worried About Running Out of Food in the Last Year:     [address] in the Last Year:    Transportation Needs:     Lack of Transportation (Medical):  Lack of Transportation (Non-Medical):    Physical Activity:     Days of Exercise per Week:     Minutes of Exercise per Session:    Stress:     Feeling of Stress :    Social Connections:     Frequency of Communication with Friends and Family:     Frequency of Social Gatherings with Friends and Family:     Attends Confucianism Services:     Active Member of Clubs or Organizations:     Attends Club or Organization Meetings:     Marital Status:    Intimate Partner Violence:     Fear of Current or Ex-Partner:     Emotionally Abused:     Physically Abused:     Sexually Abused:        DRUG/FOOD ALLERGIES: No known allergies    CURRENT MEDICATIONS  Prior to Admission medications    Medication Sig Start Date End Date Taking?  Authorizing Provider   esomeprazole Magnesium (NEXIUM) 40 MG PACK Take 40 mg by mouth daily   Yes Historical Provider, MD   triamterene-hydrochlorothiazide (MAXZIDE-25) 37.5-25 MG per tablet TK 1 T PO D 5/23/19  Yes Historical Provider, MD   BIOTIN 5000 PO Take by mouth   Yes Historical Provider, MD   Levothyroxine Sodium 88 MCG CAPS Take 88 mcg by mouth   Yes Historical Provider, MD   vitamin D (CHOLECALCIFEROL) 5000 UNITS CAPS capsule Take 5,000 Units by mouth   Yes Historical Provider, MD       Lab Studies:  Lab Results   Component Value Date    WBC 4.3 10/31/2011    HGB 13.4 10/31/2011    HCT 39.1 10/31/2011    MCV 91.9 10/31/2011     10/31/2011     Lab Results   Component Value Date    GLUCOSE 85 10/31/2011    BUN 6 (L) 10/31/2011    CREATININE 0.8 10/31/2011    K 4.2 10/31/2011     10/31/2011     10/31/2011    CALCIUM 9.5 10/31/2011     Lab Results   Component Value Date    MG 2.1 10/31/2011     No results found for: PHOS  Lab Results   Component Value Date    ALKPHOS 45 10/31/2011    ALT 18 10/31/2011    AST 21 10/31/2011    BILITOT 0.40 10/31/2011    PROT 6.9 10/31/2011     Review of Systems   Constitutional: Negative for activity change, appetite change, chills, fatigue and fever. HENT: Negative for congestion, dental problem, drooling, ear discharge, ear pain, facial swelling, hearing loss, mouth sores, nosebleeds, postnasal drip, rhinorrhea, sinus pressure, sinus pain, sneezing, sore throat, tinnitus, trouble swallowing and voice change. Eyes: Negative for pain, discharge and visual disturbance. Respiratory: Negative for apnea, cough, choking, chest tightness, shortness of breath, wheezing and stridor. Cardiovascular: Negative for palpitations. Gastrointestinal: Negative for blood in stool, constipation, diarrhea, nausea and vomiting. Endocrine: Negative for cold intolerance, heat intolerance, polydipsia, polyphagia and polyuria. Musculoskeletal: Negative for back pain, gait problem, neck pain and neck stiffness. Skin: Negative for color change, pallor, rash and wound. Allergic/Immunologic: Negative for environmental allergies, food allergies and immunocompromised state. Neurological: Negative for dizziness, facial asymmetry, speech difficulty, light-headedness, numbness and headaches. Hematological: Negative for adenopathy. Does not bruise/bleed easily.    Psychiatric/Behavioral: Negative for agitation, confusion, self-injury and sleep disturbance. The patient is not nervous/anxious. Objective:   Physical Exam  Constitutional:       Appearance: She is well-developed. She is not ill-appearing. HENT:      Head: Normocephalic and atraumatic. Not macrocephalic and not microcephalic. No raccoon eyes, Delgado's sign, abrasion, contusion, right periorbital erythema, left periorbital erythema or laceration. Hair is normal.      Jaw: No trismus. Salivary Glands: Right salivary gland is not diffusely enlarged. Left salivary gland is not diffusely enlarged. Right Ear: Hearing, tympanic membrane and external ear normal. No decreased hearing noted. No drainage, swelling or tenderness. No middle ear effusion. No mastoid tenderness. Tympanic membrane is not perforated, retracted or bulging. Tympanic membrane has normal mobility. Left Ear: Hearing, tympanic membrane and external ear normal. No decreased hearing noted. No drainage, swelling or tenderness. No middle ear effusion. No mastoid tenderness. Tympanic membrane is not perforated, retracted or bulging. Tympanic membrane has normal mobility. Ears:      Miles exam findings: does not lateralize. Right Rinne: AC > BC. Left Rinne: AC > BC. Nose: No nasal deformity, septal deviation, laceration, mucosal edema or rhinorrhea. Right Nostril: No epistaxis. Left Nostril: No epistaxis. Right Turbinates: Enlarged and swollen. Left Turbinates: Not enlarged. Right Sinus: No maxillary sinus tenderness or frontal sinus tenderness. Left Sinus: No maxillary sinus tenderness or frontal sinus tenderness. Mouth/Throat:      Lips: No lesions. Mouth: Mucous membranes are not pale, not dry and not cyanotic. No lacerations or oral lesions. Dentition: Normal dentition. No dental caries or dental abscesses. Tongue: No lesions. Palate: No mass. Pharynx: Uvula midline.  No oropharyngeal exudate, posterior oropharyngeal erythema or uvula swelling. Tonsils: No tonsillar abscesses. Eyes:      General: Lids are normal. Lids are everted, no foreign bodies appreciated. Right eye: No discharge. Left eye: No discharge. Extraocular Movements:      Right eye: Normal extraocular motion and no nystagmus. Left eye: Normal extraocular motion and no nystagmus. Conjunctiva/sclera:      Right eye: No chemosis or exudate. Left eye: No chemosis or exudate. Neck:      Thyroid: No thyroid mass or thyromegaly. Vascular: Normal carotid pulses. Trachea: Trachea normal. No tracheal deviation. Cardiovascular:      Rate and Rhythm: Normal rate and regular rhythm. Pulmonary:      Effort: No tachypnea, bradypnea or respiratory distress. Breath sounds: No stridor. Musculoskeletal:      Right shoulder: Normal range of motion. Left shoulder: Normal range of motion. Cervical back: Neck supple. Lymphadenopathy:      Head:      Right side of head: No submental, submandibular, tonsillar, preauricular, posterior auricular or occipital adenopathy. Left side of head: No submental, submandibular, tonsillar, preauricular, posterior auricular or occipital adenopathy. Cervical:      Right cervical: No superficial, deep or posterior cervical adenopathy. Left cervical: No superficial, deep or posterior cervical adenopathy. Skin:     Findings: No bruising, erythema, laceration or lesion. Neurological:      Mental Status: She is alert and oriented to person, place, and time.    Psychiatric:         Speech: Speech normal.         Behavior: Behavior normal.           Due to the patients chronic sinus disease and/or history of sinonasal neoplasm for surveillance a nasal endoscopy with or without debridement will be performed to complete a significant physical examination of the patient which cannot be performed by anterior rhinoscopy alone (failure of complete examination of the paranasal sinuses). Failure to provide this procedure may lead to late detection of significant chronic benign disease, acute exacerbation, resolution or failure of early diagnosis of recurrent cancer. The procedure report is present in the body of the chart. Nasal Endoscopy    Pre OP: nasal obstruction  Post OP:  turbinate hypertrophy  Reason: New onset symptoms since COvid  Procedure: Nasal endoscopy  Surgeon: Dorothy Shirley  Anesthesia: Afrin with 2% lidocaine  Estimated Blood Loss: None      After obtaining verbal consent from the patient 1% lidocaine with afrin was sprayed into the nasal cavities. After allowing a time for anesthesia, a nasal endoscope was placed into the nostril. The septum, inferior, and middle turbinates were examined. The middle meatus, and sphenoethmoid recess was examined bilaterally. Cultures were not obtained from the sinuses. There were no complications. Pertinent positives included: There was not edema and purulence in the left middle meatus. There was not edema and purulence at the right middle meatus. Polyps were not identified in the sinuses. Masses were not identified. Tolerated well without complication. I attest that I was present for and did the entire procedure myself. Assessment:       Diagnosis Orders   1. Nasal turbinate hypertrophy  fluticasone (FLONASE) 50 MCG/ACT nasal spray   2. Nasal obstruction  fluticasone (FLONASE) 50 MCG/ACT nasal spray           Plan:      Flonase. One spray right nostril BID. Two week course. Call if not resolved.            Ermias Bull MD

## 2021-11-05 DIAGNOSIS — J34.89 NASAL OBSTRUCTION: ICD-10-CM

## 2021-11-05 DIAGNOSIS — J34.3 NASAL TURBINATE HYPERTROPHY: ICD-10-CM

## 2021-11-08 RX ORDER — FLUTICASONE PROPIONATE 50 MCG
SPRAY, SUSPENSION (ML) NASAL
Qty: 32 G | Refills: 5 | Status: SHIPPED | OUTPATIENT
Start: 2021-11-08

## 2021-11-23 ENCOUNTER — HOSPITAL ENCOUNTER (OUTPATIENT)
Dept: MAMMOGRAPHY | Age: 46
Discharge: HOME | End: 2021-11-23
Payer: COMMERCIAL

## 2021-11-23 VITALS — HEIGHT: 64 IN | BODY MASS INDEX: 22.53 KG/M2 | WEIGHT: 132 LBS

## 2021-11-23 DIAGNOSIS — Z12.31 VISIT FOR SCREENING MAMMOGRAM: ICD-10-CM

## 2021-11-23 PROCEDURE — 77063 BREAST TOMOSYNTHESIS BI: CPT

## 2022-12-02 ENCOUNTER — HOSPITAL ENCOUNTER (OUTPATIENT)
Dept: MAMMOGRAPHY | Age: 47
Discharge: HOME | End: 2022-12-02
Payer: COMMERCIAL

## 2022-12-02 VITALS — WEIGHT: 132 LBS | HEIGHT: 64 IN | BODY MASS INDEX: 22.53 KG/M2

## 2022-12-02 DIAGNOSIS — Z12.31 VISIT FOR SCREENING MAMMOGRAM: ICD-10-CM

## 2022-12-02 PROCEDURE — 77067 SCR MAMMO BI INCL CAD: CPT

## 2023-04-25 ENCOUNTER — HOSPITAL ENCOUNTER (OUTPATIENT)
Dept: ENDOSCOPY | Age: 48
Setting detail: OUTPATIENT SURGERY
Discharge: HOME | End: 2023-04-27

## 2023-04-26 ENCOUNTER — HOSPITAL ENCOUNTER (OUTPATIENT)
Age: 48
Setting detail: OUTPATIENT SURGERY
Discharge: HOME | End: 2023-04-26
Attending: INTERNAL MEDICINE | Admitting: INTERNAL MEDICINE
Payer: COMMERCIAL

## 2023-04-26 VITALS
BODY MASS INDEX: 24.07 KG/M2 | TEMPERATURE: 97.6 F | HEART RATE: 78 BPM | HEIGHT: 64 IN | RESPIRATION RATE: 16 BRPM | OXYGEN SATURATION: 97 % | DIASTOLIC BLOOD PRESSURE: 82 MMHG | SYSTOLIC BLOOD PRESSURE: 109 MMHG | WEIGHT: 141 LBS

## 2023-04-26 LAB — HCG UR QL: NEGATIVE

## 2023-04-26 PROCEDURE — 7100000010 HC PHASE II RECOVERY - FIRST 15 MIN: Performed by: INTERNAL MEDICINE

## 2023-04-26 PROCEDURE — 2709999900 HC NON-CHARGEABLE SUPPLY: Performed by: INTERNAL MEDICINE

## 2023-04-26 PROCEDURE — 7100000011 HC PHASE II RECOVERY - ADDTL 15 MIN: Performed by: INTERNAL MEDICINE

## 2023-04-26 PROCEDURE — 3609027000 HC COLONOSCOPY: Performed by: INTERNAL MEDICINE

## 2023-04-26 PROCEDURE — 6360000002 HC RX W HCPCS: Performed by: INTERNAL MEDICINE

## 2023-04-26 PROCEDURE — 2580000003 HC RX 258: Performed by: INTERNAL MEDICINE

## 2023-04-26 PROCEDURE — 99152 MOD SED SAME PHYS/QHP 5/>YRS: CPT | Performed by: INTERNAL MEDICINE

## 2023-04-26 PROCEDURE — 84703 CHORIONIC GONADOTROPIN ASSAY: CPT

## 2023-04-26 RX ORDER — MIDAZOLAM HYDROCHLORIDE 1 MG/ML
INJECTION INTRAMUSCULAR; INTRAVENOUS PRN
Status: DISCONTINUED | OUTPATIENT
Start: 2023-04-26 | End: 2023-04-26 | Stop reason: ALTCHOICE

## 2023-04-26 RX ORDER — FENTANYL CITRATE 50 UG/ML
INJECTION, SOLUTION INTRAMUSCULAR; INTRAVENOUS PRN
Status: DISCONTINUED | OUTPATIENT
Start: 2023-04-26 | End: 2023-04-26 | Stop reason: ALTCHOICE

## 2023-04-26 RX ORDER — SODIUM CHLORIDE, SODIUM LACTATE, POTASSIUM CHLORIDE, CALCIUM CHLORIDE 600; 310; 30; 20 MG/100ML; MG/100ML; MG/100ML; MG/100ML
INJECTION, SOLUTION INTRAVENOUS CONTINUOUS
Status: DISCONTINUED | OUTPATIENT
Start: 2023-04-26 | End: 2023-04-26 | Stop reason: HOSPADM

## 2023-04-26 RX ORDER — LEVOTHYROXINE SODIUM 112 UG/1
112 TABLET ORAL DAILY
COMMUNITY

## 2023-04-26 RX ADMIN — SODIUM CHLORIDE, SODIUM LACTATE, POTASSIUM CHLORIDE, AND CALCIUM CHLORIDE: .6; .31; .03; .02 INJECTION, SOLUTION INTRAVENOUS at 11:48

## 2023-04-26 ASSESSMENT — PAIN - FUNCTIONAL ASSESSMENT
PAIN_FUNCTIONAL_ASSESSMENT: 0-10
PAIN_FUNCTIONAL_ASSESSMENT: WONG-BAKER FACES

## 2023-04-26 NOTE — DISCHARGE INSTRUCTIONS
excellent care while you are here. You may receive a survey in the mail regarding your care. We would appreciate you taking a few minutes of your time to complete this survey.  Again, thank you for choosing the [facility]
By signing this assessment you are acknowledging and agree with the diagnosis/diagnoses assigned by the Registered Dietitian

## 2023-04-26 NOTE — PROGRESS NOTES
Ambulatory Surgery/Procedure Discharge Note    Vitals:    04/26/23 1311   BP:    Pulse:    Resp:    Temp:    SpO2: 99%     Arrived in endo phase 2   Dr. Schreiber spoke with pt/family  In: 950 [P.O.:50; I.V.:900]  Out: -     Restroom use offered before discharge. Yes    Pain assessment:  none  Pain Level: 0    1329 pt awake the entire post -op time vss, alert drinking water     1338  review d/c instructions with ;t and her family both verbalized their understanding. D/c iv , up dressing. Patient discharged to home/self care.  Patient discharged via wheel chair by transporter to waiting family/S.O.       4/26/2023 1:12 PM

## 2023-04-27 NOTE — PROCEDURES
[place] [address], [address]                                 PROCEDURE NOTE    PATIENT NAME: Ambrocio Smith                     :        1975  MED REC NO:   [id number]                          ROOM:  ACCOUNT NO:   [de-identified]                           ADMIT DATE: 2023  PROVIDER:     Gene Travis MD    DATE OF PROCEDURE:  2023    PREOPERATIVE DIAGNOSIS:  History of colon polyps. POSTOPERATIVE DIAGNOSIS:  Normal examination. PROCEDURE:  Colonoscopy. SURGEON:  Gene Travis MD    ANESTHESIA:  Demerol 50 mg, Versed 5 mg. COMPLICATIONS:  None. DESCRIPTION OF PROCEDURE:  Informed consent was obtained after  explaining the risks of bleeding, infection, allergy, and perforation  requiring medical and surgical management as well as nondetection of any  colonic neoplasia. Colonoscope through the anus advanced to the cecum  was normal.  Ascending, transverse, descending, and sigmoid colon  including rectal forward and retroflexed views demonstrate normal  mucosal vascular pattern. Recovery room in stable condition. IMPRESSION AND PLAN:  Surveillance colonoscopy in five years.         Michelle Nicolas MD    D: 2023 13:00:32       T: 2023 22:12:45     [id number]  Job#: [id number]     Doc#: [id number]    CC:  Gene Travis MD

## 2023-12-14 ENCOUNTER — HOSPITAL ENCOUNTER (OUTPATIENT)
Dept: MAMMOGRAPHY | Age: 48
Discharge: HOME | End: 2023-12-14
Payer: COMMERCIAL

## 2023-12-14 VITALS — BODY MASS INDEX: 23.9 KG/M2 | WEIGHT: 140 LBS | HEIGHT: 64 IN

## 2023-12-14 DIAGNOSIS — Z12.31 VISIT FOR SCREENING MAMMOGRAM: ICD-10-CM

## 2023-12-14 PROCEDURE — 77063 BREAST TOMOSYNTHESIS BI: CPT

## 2024-12-31 ENCOUNTER — PROCEDURE VISIT (OUTPATIENT)
Dept: AUDIOLOGY | Age: 49
End: 2024-12-31
Payer: COMMERCIAL

## 2024-12-31 DIAGNOSIS — H93.8X1 SENSATION OF FULLNESS IN RIGHT EAR: ICD-10-CM

## 2024-12-31 DIAGNOSIS — H90.3 SENSORINEURAL HEARING LOSS, BILATERAL: Primary | ICD-10-CM

## 2024-12-31 PROCEDURE — 92567 TYMPANOMETRY: CPT

## 2024-12-31 PROCEDURE — 92557 COMPREHENSIVE HEARING TEST: CPT

## 2024-12-31 NOTE — PROGRESS NOTES
Marilyn Flores   1975, 49 y.o. female   [id number]       Referring Provider: Sanchez Baeza MD, PhD  Referral Type: In an order in Epic    Reason for Visit: Evaluation of suspected change in hearing, tinnitus, or balance.    ADULT AUDIOLOGIC EVALUATION      Marilyn Flores is a 49 y.o. female seen today, 12/31/2024 , for an initial audiologic evaluation.  Patient to be seen by Sanchez Baeza MD, PhD following today's evaluation on 1/2/25.    AUDIOLOGIC AND OTHER PERTINENT MEDICAL HISTORY:      Marilyn Flores reports on [place] day having right ear pressure. She said she stuck her finger in her ear and proceeded to have blood draining from her ear. She says she used a qtip the night before incident, but did not jam it into her ear. She says on 12/27, she had a sore throat and was given augmentin. She also notes having a sinus infection after having ear pressure and drainage. She still notes right ear fullness with occasional tinnitus. Patient notes hearing is slightly diminished, but she can still hear.     She denied otalgia, otorrhea, dizziness, imbalance, history of falls, history of noise exposure, history of head trauma, history of ear surgery, and family history of hearing loss    Date: 12/31/2024     IMPRESSIONS:      Today's results revealed symmetric sensorineural hearing loss with Excellent speech understanding when in quiet, bilaterally. Tympanometry indicates normal middle ear function. Discussed test results and implications with patient.  Follow medical recommendations of Sanchez Baeza MD, PhD.     ASSESSMENT AND FINDINGS:     Otoscopy revealed non-occluding piece of dried blood in the right, clear  in the left. No sign of tympanic membrane/eardrum perforation in the right ear .    RIGHT EAR:  Hearing Sensitivity: Normal hearing from 250-4000Hz, sloping to mild sensorineural hearing loss at 8000Hz  Speech Recognition Threshold: 5 dB HL  Word Recognition: Excellent 100%, based on NU-6 25-word list at 45

## 2025-01-02 ENCOUNTER — OFFICE VISIT (OUTPATIENT)
Dept: ENT CLINIC | Age: 50
End: 2025-01-02
Payer: COMMERCIAL

## 2025-01-02 VITALS
DIASTOLIC BLOOD PRESSURE: 89 MMHG | HEIGHT: 64 IN | WEIGHT: 146.8 LBS | TEMPERATURE: 97.3 F | BODY MASS INDEX: 25.06 KG/M2 | HEART RATE: 82 BPM | SYSTOLIC BLOOD PRESSURE: 130 MMHG

## 2025-01-02 DIAGNOSIS — S01.311A LACERATION OF RIGHT EAR CANAL, INITIAL ENCOUNTER: Primary | ICD-10-CM

## 2025-01-02 PROCEDURE — 99214 OFFICE O/P EST MOD 30 MIN: CPT | Performed by: OTOLARYNGOLOGY

## 2025-01-02 RX ORDER — CIPROFLOXACIN AND DEXAMETHASONE 3; 1 MG/ML; MG/ML
4 SUSPENSION/ DROPS AURICULAR (OTIC) 2 TIMES DAILY
Qty: 1 EACH | Refills: 0 | Status: SHIPPED | OUTPATIENT
Start: 2025-01-02 | End: 2025-01-12

## 2025-01-02 ASSESSMENT — ENCOUNTER SYMPTOMS
EYE REDNESS: 0
NAUSEA: 0
FACIAL SWELLING: 0
CHOKING: 0
EYE PAIN: 0
COUGH: 0
DIARRHEA: 0
SINUS PAIN: 0
TROUBLE SWALLOWING: 0
RHINORRHEA: 0
EYE ITCHING: 0
SORE THROAT: 0
SHORTNESS OF BREATH: 0
SINUS PRESSURE: 0
VOICE CHANGE: 0

## 2025-01-02 NOTE — PROGRESS NOTES
Sinus: No maxillary sinus tenderness or frontal sinus tenderness.      Mouth/Throat:      Lips: No lesions.      Mouth: Mucous membranes are moist.      Tongue: No lesions.      Palate: No mass.      Pharynx: Uvula midline. No oropharyngeal exudate or posterior oropharyngeal erythema.      Tonsils: No tonsillar abscesses.   Eyes:      General:         Right eye: No discharge.         Left eye: No discharge.      Extraocular Movements:      Right eye: Normal extraocular motion and no nystagmus.      Left eye: Normal extraocular motion and no nystagmus.      Conjunctiva/sclera:      Right eye: Right conjunctiva is not injected. No chemosis or exudate.     Left eye: Left conjunctiva is not injected. No chemosis or exudate.     Pupils:      Right eye: Pupil is reactive.      Left eye: Pupil is reactive.   Neck:      Thyroid: No thyroid mass or thyromegaly.   Cardiovascular:      Rate and Rhythm: Normal rate and regular rhythm.   Pulmonary:      Effort: No tachypnea or respiratory distress.   Musculoskeletal:      Cervical back: Normal range of motion and neck supple.   Lymphadenopathy:      Head:      Right side of head: No preauricular, posterior auricular or occipital adenopathy.      Left side of head: No preauricular, posterior auricular or occipital adenopathy.      Cervical:      Right cervical: No superficial, deep or posterior cervical adenopathy.     Left cervical: No superficial, deep or posterior cervical adenopathy.   Skin:     Findings: No bruising, erythema or lesion.   Neurological:      Mental Status: She is alert and oriented to person, place, and time.   Psychiatric:         Attention and Perception: Attention normal.         Mood and Affect: Mood normal.         Speech: Speech normal.          Media Information    Document Information    [facility] [place]   audiogram and tymp 12/31/24 12/31/2024   Attached To:   Procedure visit on 12/31/24 with Madai Nava AuD   Source

## 2025-01-16 ENCOUNTER — OFFICE VISIT (OUTPATIENT)
Dept: ENT CLINIC | Age: 50
End: 2025-01-16
Payer: COMMERCIAL

## 2025-01-16 VITALS
HEART RATE: 93 BPM | WEIGHT: 147 LBS | HEIGHT: 64 IN | BODY MASS INDEX: 25.1 KG/M2 | SYSTOLIC BLOOD PRESSURE: 124 MMHG | DIASTOLIC BLOOD PRESSURE: 86 MMHG | TEMPERATURE: 98 F

## 2025-01-16 DIAGNOSIS — S01.311A LACERATION OF RIGHT EAR CANAL, INITIAL ENCOUNTER: Primary | ICD-10-CM

## 2025-01-16 PROCEDURE — 99213 OFFICE O/P EST LOW 20 MIN: CPT | Performed by: OTOLARYNGOLOGY

## 2025-01-16 NOTE — PROGRESS NOTES
Speech normal.              Assessment:        Diagnosis Orders   1. Laceration of right ear canal, initial encounter               Plan:       Switch to hydrogen peroxide drops BID. 4-5 drops right ear.   Continue till follow up in 6 weeks.   Total time spent with the patient reviewing charts, lab tests, images, outside medical visits, history, examination, answering  questions and formulating treatment plan was 21 minutes.  New 2 - 15 Return 2 - 10  New 3 - 30 Return 3 - 20  New 4 - 45 Return 4 - 30  New 5 - 60 Return 5 - 40+          Sanchez Baeza MD

## 2025-01-17 ENCOUNTER — HOSPITAL ENCOUNTER (OUTPATIENT)
Dept: MAMMOGRAPHY | Age: 50
Discharge: HOME | End: 2025-01-17
Payer: COMMERCIAL

## 2025-01-17 VITALS — HEIGHT: 64 IN | BODY MASS INDEX: 25.1 KG/M2 | WEIGHT: 147 LBS

## 2025-01-17 DIAGNOSIS — Z12.31 VISIT FOR SCREENING MAMMOGRAM: ICD-10-CM

## 2025-01-17 PROCEDURE — 77063 BREAST TOMOSYNTHESIS BI: CPT

## 2025-02-24 ENCOUNTER — OFFICE VISIT (OUTPATIENT)
Dept: ENT CLINIC | Age: 50
End: 2025-02-24
Payer: COMMERCIAL

## 2025-02-24 VITALS — DIASTOLIC BLOOD PRESSURE: 85 MMHG | TEMPERATURE: 97.7 F | HEART RATE: 87 BPM | SYSTOLIC BLOOD PRESSURE: 124 MMHG

## 2025-02-24 DIAGNOSIS — S01.311A LACERATION OF RIGHT EAR CANAL, INITIAL ENCOUNTER: Primary | ICD-10-CM

## 2025-02-24 PROCEDURE — 99212 OFFICE O/P EST SF 10 MIN: CPT | Performed by: OTOLARYNGOLOGY

## 2025-02-24 NOTE — PROGRESS NOTES
Subjective:      Patient ID: Marilyn Flores is a 49 y.o. female.    HPI  Hearing Loss HPI  CC: ear pain    General: Marilyn is a(n) 49 y.o. female who presents with a one week history of ear pain and pressure. Started Haugen Stefanie. Pain. Blood on pillow. Admits to using qtip the day before. Also takes fish oil regularly. Hearing was muffled but better now. Had audiogram 12-24. Reviewed and interpreted. Normal audiogram.   Tinnitus:No  Otorrhea:No  Vertigo:No  Prior ear surgery: No  Ear trauma: No  History of hearing loss: No    Patient here for 2 week follow up ear lac. Has been on cipro-dexa drops. States much improved with drops but still some occasional aches and pains. Completed abx drops.     Patient here for 6 week follow up. States doing well. No pain or drainage.       Patient Active Problem List   Diagnosis    Chondromalacia patellae of right knee    Right knee pain    Hypertrophy of infrapatellar fat pad left knee    Chondromalacia patellae of left knee    Left knee pain    Plica syndrome of left knee    Primary osteoarthritis of left knee     Past Surgical History:   Procedure Laterality Date    COLONOSCOPY N/A 4/26/2023    COLONOSCOPY performed by Kyle MCKAY MD at [facility] ENDOSCOPY    HYSTERECTOMY (CERVIX STATUS UNKNOWN)      KNEE ARTHROSCOPY      MYOMECTOMY  09, 11, 13    OVARY REMOVAL Right     age 40    UPPER GASTROINTESTINAL ENDOSCOPY N/A 8/21/2019    ESOPHAGOGASTRODUODENOSCOPY BIOPSY performed by Kyle MCKAY MD at [facility] ENDOSCOPY     Family History   Problem Relation Age of Onset    Breast Cancer Mother 60    BRCA 1 Negative Mother     BRCA 2 Negative Mother      Social History     Socioeconomic History    Marital status: Single     Spouse name: Not on file    Number of children: Not on file    Years of education: Not on file    Highest education level: Not on file   Occupational History    Not on file   Tobacco Use    Smoking status: Never    Smokeless tobacco: Never   Vaping Use

## 2025-03-20 ENCOUNTER — OFFICE VISIT (OUTPATIENT)
Dept: ENT CLINIC | Age: 50
End: 2025-03-20
Payer: COMMERCIAL

## 2025-03-20 VITALS — TEMPERATURE: 97.5 F | DIASTOLIC BLOOD PRESSURE: 86 MMHG | SYSTOLIC BLOOD PRESSURE: 127 MMHG | HEART RATE: 78 BPM

## 2025-03-20 DIAGNOSIS — H93.11 TINNITUS OF RIGHT EAR: Primary | ICD-10-CM

## 2025-03-20 PROCEDURE — 99212 OFFICE O/P EST SF 10 MIN: CPT | Performed by: OTOLARYNGOLOGY

## 2025-03-20 ASSESSMENT — ENCOUNTER SYMPTOMS
CHOKING: 0
EYE REDNESS: 0
VOICE CHANGE: 0
RHINORRHEA: 0
TROUBLE SWALLOWING: 0
FACIAL SWELLING: 0
DIARRHEA: 0
EYE ITCHING: 0
SORE THROAT: 0
COUGH: 0
SHORTNESS OF BREATH: 0
SINUS PAIN: 0
SINUS PRESSURE: 0
NAUSEA: 0
EYE PAIN: 0

## 2025-03-20 NOTE — PROGRESS NOTES
Subjective:      Patient ID: Marilyn Flores is a 49 y.o. female.    HPI  Hearing Loss HPI  CC: ear    General: Marilyn is a(n) 49 y.o. female who presents with a 2 week history of right ear fullness and some ringing. Started after flight back from [place]. Better but ringing still present.   Previous episodes: no  Tinnitus:Yes, right, improved  Otorrhea:No  Vertigo:No  Prior ear surgery: No  Ear trauma: No  History of hearing loss: No      Patient Active Problem List   Diagnosis    Chondromalacia patellae of right knee    Right knee pain    Hypertrophy of infrapatellar fat pad left knee    Chondromalacia patellae of left knee    Left knee pain    Plica syndrome of left knee    Primary osteoarthritis of left knee     Past Surgical History:   Procedure Laterality Date    COLONOSCOPY N/A 4/26/2023    COLONOSCOPY performed by Kyle MCKAY MD at [facility] ENDOSCOPY    HYSTERECTOMY (CERVIX STATUS UNKNOWN)      KNEE ARTHROSCOPY      MYOMECTOMY  09, 11, 13    OVARY REMOVAL Right     age 40    UPPER GASTROINTESTINAL ENDOSCOPY N/A 8/21/2019    ESOPHAGOGASTRODUODENOSCOPY BIOPSY performed by Kyle MCKAY MD at [facility] ENDOSCOPY     Family History   Problem Relation Age of Onset    Breast Cancer Mother 60    BRCA 1 Negative Mother     BRCA 2 Negative Mother      Social History     Socioeconomic History    Marital status: Single     Spouse name: Not on file    Number of children: Not on file    Years of education: Not on file    Highest education level: Not on file   Occupational History    Not on file   Tobacco Use    Smoking status: Never    Smokeless tobacco: Never   Vaping Use    Vaping status: Never Used   Substance and Sexual Activity    Alcohol use: Yes     Alcohol/week: 0.0 standard drinks of alcohol     Comment: socially    Drug use: Not Currently    Sexual activity: Not on file   Other Topics Concern    Not on file   Social History Narrative    Not on file     Social Drivers of Health     Financial Resource

## (undated) DEVICE — FORCEPS BX L240CM DIA2.4MM L NDL RAD JAW 4 133340

## (undated) DEVICE — CANNULA SAMP CO2 AD GRN 7FT CO2 AND 7FT O2 TBNG UNIV CONN